# Patient Record
Sex: FEMALE | Race: OTHER | HISPANIC OR LATINO | Employment: OTHER | URBAN - METROPOLITAN AREA
[De-identification: names, ages, dates, MRNs, and addresses within clinical notes are randomized per-mention and may not be internally consistent; named-entity substitution may affect disease eponyms.]

---

## 2018-02-27 ENCOUNTER — HOSPITAL ENCOUNTER (EMERGENCY)
Facility: HOSPITAL | Age: 33
Discharge: HOME | End: 2018-02-27
Attending: EMERGENCY MEDICINE | Admitting: EMERGENCY MEDICINE
Payer: MEDICARE

## 2018-02-27 ENCOUNTER — APPOINTMENT (EMERGENCY)
Dept: RADIOLOGY | Facility: HOSPITAL | Age: 33
End: 2018-02-27
Payer: MEDICARE

## 2018-02-27 VITALS
OXYGEN SATURATION: 95 % | WEIGHT: 135 LBS | BODY MASS INDEX: 23.92 KG/M2 | HEART RATE: 74 BPM | HEIGHT: 63 IN | DIASTOLIC BLOOD PRESSURE: 57 MMHG | SYSTOLIC BLOOD PRESSURE: 124 MMHG | TEMPERATURE: 98.6 F | RESPIRATION RATE: 18 BRPM

## 2018-02-27 DIAGNOSIS — R07.9 CHEST PAIN, UNSPECIFIED TYPE: Primary | ICD-10-CM

## 2018-02-27 LAB
ALBUMIN SERPL BCP-MCNC: 4 G/DL (ref 3.5–5)
ALP SERPL-CCNC: 91 U/L (ref 46–116)
ALT SERPL W P-5'-P-CCNC: 16 U/L (ref 12–78)
AMPHETAMINES SERPL QL SCN: NEGATIVE
ANION GAP SERPL CALCULATED.3IONS-SCNC: 11 MMOL/L (ref 4–13)
APTT PPP: 27 SECONDS (ref 23–35)
AST SERPL W P-5'-P-CCNC: 11 U/L (ref 5–45)
BARBITURATES UR QL: NEGATIVE
BASOPHILS # BLD AUTO: 0.03 THOUSANDS/ΜL (ref 0–0.1)
BASOPHILS NFR BLD AUTO: 0 % (ref 0–1)
BENZODIAZ UR QL: NEGATIVE
BILIRUB SERPL-MCNC: 0.36 MG/DL (ref 0.2–1)
BUN SERPL-MCNC: 16 MG/DL (ref 5–25)
CALCIUM SERPL-MCNC: 8.6 MG/DL (ref 8.3–10.1)
CHLORIDE SERPL-SCNC: 106 MMOL/L (ref 100–108)
CK MB SERPL-MCNC: <1 % (ref 0–2.5)
CK MB SERPL-MCNC: <1 NG/ML (ref 0–5)
CK SERPL-CCNC: 146 U/L (ref 26–192)
CO2 SERPL-SCNC: 25 MMOL/L (ref 21–32)
COCAINE UR QL: NEGATIVE
CREAT SERPL-MCNC: 0.86 MG/DL (ref 0.6–1.3)
DEPRECATED D DIMER PPP: <270 NG/ML (FEU) (ref 0–424)
EOSINOPHIL # BLD AUTO: 0.08 THOUSAND/ΜL (ref 0–0.61)
EOSINOPHIL NFR BLD AUTO: 1 % (ref 0–6)
ERYTHROCYTE [DISTWIDTH] IN BLOOD BY AUTOMATED COUNT: 12.4 % (ref 11.6–15.1)
EXT PREG TEST URINE: NEGATIVE
GFR SERPL CREATININE-BSD FRML MDRD: 91 ML/MIN/1.73SQ M
GLUCOSE SERPL-MCNC: 102 MG/DL (ref 65–140)
HCT VFR BLD AUTO: 39 % (ref 34.8–46.1)
HGB BLD-MCNC: 13.2 G/DL (ref 11.5–15.4)
INR PPP: 1 (ref 0.86–1.16)
LYMPHOCYTES # BLD AUTO: 4.15 THOUSANDS/ΜL (ref 0.6–4.47)
LYMPHOCYTES NFR BLD AUTO: 37 % (ref 14–44)
MAGNESIUM SERPL-MCNC: 2 MG/DL (ref 1.6–2.6)
MCH RBC QN AUTO: 28.7 PG (ref 26.8–34.3)
MCHC RBC AUTO-ENTMCNC: 33.8 G/DL (ref 31.4–37.4)
MCV RBC AUTO: 85 FL (ref 82–98)
METHADONE UR QL: NEGATIVE
MONOCYTES # BLD AUTO: 0.66 THOUSAND/ΜL (ref 0.17–1.22)
MONOCYTES NFR BLD AUTO: 6 % (ref 4–12)
NEUTROPHILS # BLD AUTO: 6.2 THOUSANDS/ΜL (ref 1.85–7.62)
NEUTS SEG NFR BLD AUTO: 56 % (ref 43–75)
NRBC BLD AUTO-RTO: 0 /100 WBCS
OPIATES UR QL SCN: NEGATIVE
PCP UR QL: NEGATIVE
PLATELET # BLD AUTO: 207 THOUSANDS/UL (ref 149–390)
PMV BLD AUTO: 12.5 FL (ref 8.9–12.7)
POTASSIUM SERPL-SCNC: 3.4 MMOL/L (ref 3.5–5.3)
PROT SERPL-MCNC: 7.3 G/DL (ref 6.4–8.2)
PROTHROMBIN TIME: 13.2 SECONDS (ref 12.1–14.4)
RBC # BLD AUTO: 4.6 MILLION/UL (ref 3.81–5.12)
SODIUM SERPL-SCNC: 142 MMOL/L (ref 136–145)
THC UR QL: NEGATIVE
TROPONIN I SERPL-MCNC: <0.02 NG/ML
WBC # BLD AUTO: 11.12 THOUSAND/UL (ref 4.31–10.16)

## 2018-02-27 PROCEDURE — 85730 THROMBOPLASTIN TIME PARTIAL: CPT | Performed by: NURSE PRACTITIONER

## 2018-02-27 PROCEDURE — 84484 ASSAY OF TROPONIN QUANT: CPT | Performed by: EMERGENCY MEDICINE

## 2018-02-27 PROCEDURE — 82553 CREATINE MB FRACTION: CPT | Performed by: NURSE PRACTITIONER

## 2018-02-27 PROCEDURE — 83735 ASSAY OF MAGNESIUM: CPT | Performed by: NURSE PRACTITIONER

## 2018-02-27 PROCEDURE — 96374 THER/PROPH/DIAG INJ IV PUSH: CPT

## 2018-02-27 PROCEDURE — 80307 DRUG TEST PRSMV CHEM ANLYZR: CPT | Performed by: NURSE PRACTITIONER

## 2018-02-27 PROCEDURE — 36415 COLL VENOUS BLD VENIPUNCTURE: CPT | Performed by: NURSE PRACTITIONER

## 2018-02-27 PROCEDURE — 93005 ELECTROCARDIOGRAM TRACING: CPT

## 2018-02-27 PROCEDURE — 80053 COMPREHEN METABOLIC PANEL: CPT | Performed by: EMERGENCY MEDICINE

## 2018-02-27 PROCEDURE — 99285 EMERGENCY DEPT VISIT HI MDM: CPT

## 2018-02-27 PROCEDURE — 82550 ASSAY OF CK (CPK): CPT | Performed by: NURSE PRACTITIONER

## 2018-02-27 PROCEDURE — 85610 PROTHROMBIN TIME: CPT | Performed by: NURSE PRACTITIONER

## 2018-02-27 PROCEDURE — 71046 X-RAY EXAM CHEST 2 VIEWS: CPT

## 2018-02-27 PROCEDURE — 81025 URINE PREGNANCY TEST: CPT | Performed by: NURSE PRACTITIONER

## 2018-02-27 PROCEDURE — 85025 COMPLETE CBC W/AUTO DIFF WBC: CPT | Performed by: EMERGENCY MEDICINE

## 2018-02-27 PROCEDURE — 85379 FIBRIN DEGRADATION QUANT: CPT | Performed by: NURSE PRACTITIONER

## 2018-02-27 RX ORDER — KETOROLAC TROMETHAMINE 30 MG/ML
15 INJECTION, SOLUTION INTRAMUSCULAR; INTRAVENOUS ONCE
Status: COMPLETED | OUTPATIENT
Start: 2018-02-27 | End: 2018-02-27

## 2018-02-27 RX ORDER — MEDROXYPROGESTERONE ACETATE 150 MG/ML
150 INJECTION, SUSPENSION INTRAMUSCULAR
COMMUNITY

## 2018-02-27 RX ADMIN — KETOROLAC TROMETHAMINE 15 MG: 30 INJECTION, SOLUTION INTRAMUSCULAR at 20:03

## 2018-02-28 NOTE — DISCHARGE INSTRUCTIONS
You stated your pain was gone after pain medication administration  You refused to have IV fluids and requested discharge  You are to follow up with your PCP as instructed  Chest Pain, Ambulatory Care   GENERAL INFORMATION:   Chest pain  can be caused by a range of conditions, from not serious to life-threatening  It may be caused by a heart attack or a blood clot in your lungs  Sometimes chest pain or pressure is caused by poor blood flow to your heart (angina)  Infection, inflammation, or a fracture in the bones or cartilage in your chest can cause pain or discomfort  Chest pain can also be a symptom of a digestive problem, such as acid reflux or a stomach ulcer  Common symptoms include the following:   · Fever or sweating     · Nausea or vomiting     · Shortness of breath     · Discomfort or pressure that spreads from your chest to your back, jaw, or arm     · A racing or slow heartbeat     · Feeling weak, tired, or faint  Seek immediate care for the following symptoms:   · Any of the following signs of a heart attack:      ¨ Squeezing, pressure, or pain in your chest that lasts longer than 5 minutes or returns    ¨ Discomfort or pain in your back, neck, jaw, stomach, or arm     ¨ Trouble breathing     ¨ Nausea or vomiting    ¨ Lightheadedness or a sudden cold sweat, especially with trouble breathing         · Chest discomfort that gets worse, even with medicine    · Coughing or vomiting blood    · Black or bloody bowel movements     · Vomiting that does not stop, or pain when you swallow  Treatment for chest pain  may include medicine to treat your symptoms while he determines the cause of your chest pain  You may also need any of the following:  · Antiplatelets , such as aspirin, help prevent blood clots  Take your antiplatelet medicine exactly as directed  These medicines make it more likely for you to bleed or bruise  If you are told to take aspirin, do not take acetaminophen or ibuprofen instead  · Prescription pain medicine  may be given  Ask how to take this medicine safely  Do not smoke: If you smoke, it is never too late to quit  Smoking increases your risk for a heart attack and other heart and lung conditions  Ask your healthcare provider for information about how to stop smoking if you need help  Follow up with your healthcare provider as directed: You may need more tests  You may be referred to a specialist, such as a cardiologist or gastroenterologist  Write down your questions so you remember to ask them during your visits  CARE AGREEMENT:   You have the right to help plan your care  Learn about your health condition and how it may be treated  Discuss treatment options with your caregivers to decide what care you want to receive  You always have the right to refuse treatment  The above information is an  only  It is not intended as medical advice for individual conditions or treatments  Talk to your doctor, nurse or pharmacist before following any medical regimen to see if it is safe and effective for you  © 2014 [address] is for End User's use only and may not be sold, redistributed or otherwise used for commercial purposes  All illustrations and images included in CareNotes® are the copyrighted property of A D A M , Inc  or [address]

## 2018-02-28 NOTE — ED PROVIDER NOTES
History  Chief Complaint   Patient presents with    Chest Pain     pt was at home sitting in bed with kids watching youtube videos when sudden onset of left sided chest pain/epigastric pain started  reports feels hard to take a deep breath  This is a 29year old female who states while getting ready for bed around 630pm tonight she developed severe left chest which is under her left breast, pain that was worse with deep inspiration  She states that she had a sharp pain that felt like she was loosing her breath  She denies recent travel or hx of DVT or PE   + smoker, + depo provera  She denies taking anything for pain  Pt arrived via EMS         History provided by:  Patient and medical records   used: No        Prior to Admission Medications   Prescriptions Last Dose Informant Patient Reported? Taking? medroxyPROGESTERone (DEPO-PROVERA) 150 mg/mL injection   Yes Yes   Sig: Inject 150 mg into the shoulder, thigh, or buttocks every 3 (three) months      Facility-Administered Medications: None       Past Medical History:   Diagnosis Date    No known health problems        Past Surgical History:   Procedure Laterality Date    EYE SURGERY Bilateral        History reviewed  No pertinent family history  I have reviewed and agree with the history as documented  Social History   Substance Use Topics    Smoking status: Current Every Day Smoker     Packs/day: 0 20     Types: Cigarettes    Smokeless tobacco: Never Used    Alcohol use Yes        Review of Systems   Constitutional: Negative  HENT: Negative  Eyes: Negative  Respiratory: Positive for shortness of breath  Cardiovascular: Positive for chest pain  Gastrointestinal: Negative  Endocrine: Negative  Genitourinary: Negative  Musculoskeletal: Negative  Skin: Negative  Allergic/Immunologic: Negative  Neurological: Negative  Hematological: Negative  Psychiatric/Behavioral: Negative  Physical Exam  ED Triage Vitals   Temperature Pulse Respirations Blood Pressure SpO2   02/27/18 1845 02/27/18 1845 02/27/18 1845 02/27/18 1845 02/27/18 1845   98 6 °F (37 °C) 69 18 119/56 95 %      Temp Source Heart Rate Source Patient Position - Orthostatic VS BP Location FiO2 (%)   02/27/18 1845 02/27/18 1915 02/27/18 1915 02/27/18 1915 --   Oral Monitor Sitting Right arm       Pain Score       02/27/18 1845       8           Orthostatic Vital Signs  Vitals:    02/27/18 1845 02/27/18 1915   BP: 119/56 124/57   Pulse: 69 74   Patient Position - Orthostatic VS:  Sitting       Physical Exam   Constitutional: She is oriented to person, place, and time  She appears well-developed and well-nourished  No distress  Pt is lying on the exam bed looking at cell phone   HENT:   Head: Normocephalic and atraumatic  Eyes: EOM are normal  Pupils are equal, round, and reactive to light  Neck: Normal range of motion  Neck supple  Cardiovascular: Normal rate, regular rhythm and normal heart sounds  Pulmonary/Chest: Effort normal and breath sounds normal    Abdominal: Soft  Bowel sounds are normal  She exhibits no distension  There is no tenderness  Musculoskeletal: Normal range of motion  She exhibits no edema, tenderness or deformity  Neurological: She is alert and oriented to person, place, and time  Skin: Skin is warm and dry  Capillary refill takes less than 2 seconds  She is not diaphoretic  Psychiatric: She has a normal mood and affect  Her behavior is normal  Judgment and thought content normal    Nursing note and vitals reviewed        ED Medications  Medications   sodium chloride 0 9 % bolus 500 mL (not administered)   ketorolac (TORADOL) injection 15 mg (15 mg Intravenous Given 2/27/18 2003)       Diagnostic Studies  Results Reviewed     Procedure Component Value Units Date/Time    Magnesium [[id number]]  (Normal) Collected:  02/27/18 1849    Lab Status:  Final result Specimen:  Blood from Arm, Left Updated:  02/27/18 2049     Magnesium 2 0 mg/dL     CK Total with Reflex CKMB [[id number]]  (Normal) Collected:  02/27/18 1849    Lab Status:  Final result Specimen:  Blood from Arm, Left Updated:  02/27/18 2048     Total  U/L     CKMB [[id number]] Collected:  02/27/18 1849    Lab Status: In process Specimen:  Blood from Arm, Left Updated:  02/27/18 2048    D-Dimer [[id number]]  (Normal) Collected:  02/27/18 1937    Lab Status:  Final result Specimen:  Blood from Arm, Left Updated:  02/27/18 2039     D-Dimer, Quant <270 ng/ml (FEU)     Rapid drug screen, urine [[id number]]  (Normal) Collected:  02/27/18 1950    Lab Status:  Final result Specimen:  Urine from Urine, Clean Catch Updated:  02/27/18 2014     Amph/Meth UR Negative     Barbiturate Ur Negative     Benzodiazepine Urine Negative     Cocaine Urine Negative     Methadone Urine Negative     Opiate Urine Negative     PCP Ur Negative     THC Urine Negative    Narrative:         FOR MEDICAL PURPOSES ONLY  IF CONFIRMATION NEEDED PLEASE CONTACT THE LAB WITHIN 5 DAYS  Drug Screen Cutoff Levels:  AMPHETAMINE/METHAMPHETAMINES  1000 ng/mL  BARBITURATES     200 ng/mL  BENZODIAZEPINES     200 ng/mL  COCAINE      300 ng/mL  METHADONE      300 ng/mL  OPIATES      300 ng/mL  PHENCYCLIDINE     25 ng/mL  THC       50 ng/mL    Protime-INR [[id number]]  (Normal) Collected:  02/27/18 1937    Lab Status:  Final result Specimen:  Blood from Arm, Left Updated:  02/27/18 2009     Protime 13 2 seconds      INR 1 00    APTT [[id number]]  (Normal) Collected:  02/27/18 1937    Lab Status:  Final result Specimen:  Blood from Arm, Left Updated:  02/27/18 2009     PTT 27 seconds     Narrative:          Therapeutic Heparin Range = 60-90 seconds    POCT pregnancy, urine [[id number]]  (Normal) Resulted:  02/27/18 1953    Lab Status:  Final result Updated:  02/27/18 1953     EXT PREG TEST UR (Ref: Negative) negative    Comprehensive metabolic panel [[id number]]  (Abnormal) Collected:  02/27/18 1849 Lab Status:  Final result Specimen:  Blood from Arm, Left Updated:  02/27/18 1928     Sodium 142 mmol/L      Potassium 3 4 (L) mmol/L      Chloride 106 mmol/L      CO2 25 mmol/L      Anion Gap 11 mmol/L      BUN 16 mg/dL      Creatinine 0 86 mg/dL      Glucose 102 mg/dL      Calcium 8 6 mg/dL      AST 11 U/L      ALT 16 U/L      Alkaline Phosphatase 91 U/L      Total Protein 7 3 g/dL      Albumin 4 0 g/dL      Total Bilirubin 0 36 mg/dL      eGFR 91 ml/min/1 73sq m     Narrative:         National Kidney Disease Education Program recommendations are as follows:  GFR calculation is accurate only with a steady state creatinine  Chronic Kidney disease less than 60 ml/min/1 73 sq  meters  Kidney failure less than 15 ml/min/1 73 sq  meters  Troponin I [[id number]]  (Normal) Collected:  02/27/18 1849    Lab Status:  Final result Specimen:  Blood from Arm, Left Updated:  02/27/18 1924     Troponin I <0 02 ng/mL     Narrative:         [employer] Chemistry analyzer 99% cutoff is > 0 04 ng/mL in network labs    o cTnI 99% cutoff is useful only when applied to patients in the clinical setting of myocardial ischemia  o cTnI 99% cutoff should be interpreted in the context of clinical history, ECG findings and possibly cardiac imaging to establish correct diagnosis  o cTnI 99% cutoff may be suggestive but clearly not indicative of a coronary event without the clinical setting of myocardial ischemia      CBC and differential [[id number]]  (Abnormal) Collected:  02/27/18 1849    Lab Status:  Final result Specimen:  Blood from Arm, Left Updated:  02/27/18 1906     WBC 11 12 (H) Thousand/uL      RBC 4 60 Million/uL      Hemoglobin 13 2 g/dL      Hematocrit 39 0 %      MCV 85 fL      MCH 28 7 pg      MCHC 33 8 g/dL      RDW 12 4 %      MPV 12 5 fL      Platelets 742 Thousands/uL      nRBC 0 /100 WBCs      Neutrophils Relative 56 %      Lymphocytes Relative 37 %      Monocytes Relative 6 %      Eosinophils Relative 1 %      Basophils Relative 0 %      Neutrophils Absolute 6 20 Thousands/µL      Lymphocytes Absolute 4 15 Thousands/µL      Monocytes Absolute 0 66 Thousand/µL      Eosinophils Absolute 0 08 Thousand/µL      Basophils Absolute 0 03 Thousands/µL                  X-ray chest 2 views   Final Result by Celia Fatima MD (02/27 1949)      No acute cardiopulmonary disease  Workstation performed: [id number]                    Procedures  ECG 12 Lead Documentation  Date/Time: 2/27/2018 7:57 PM  Performed by: Amira Arriaga  Authorized by: Tricia JACOBS     Indications / Diagnosis:  Chest pain   ECG reviewed by me, the ED Provider: yes (Dr Crystal Sepulveda )    Patient location:  ED and bedside  Previous ECG:     Previous ECG:  Unavailable    Comparison to cardiac monitor: No    Interpretation:     Interpretation: abnormal    Rate:     ECG rate:  68    ECG rate assessment: normal    Rhythm:     Rhythm: sinus rhythm             Phone Contacts  ED Phone Contact    ED Course  ED Course as of Feb 27 2102 Tue Feb 27, 2018 2056 I entered room and pt was sleeping  Pt woke up and states "I am dizzy and my hands are numb"  I asked pt if the pain was gone  Pt states "for now"  I informed pt I would give her some fluids since she feels dizzy  She states "this is crazy, I am just sitting here"  I have informed pt that she is not just sitting there  I explained I have given her pain medication for the pain, labs to check for abnormalities and a CXR and that is not just sitting there  Pt states "this is crazy, I don't want fluids, I just want to go"  Pt will be d/c home with follow up and understanding of instructions                      PERC Rule for PE    Flowsheet Row Most Recent Value   PERC Rule for PE   Age >=50  0 Filed at: 02/27/2018 1941   HR >=100  0 Filed at: 02/27/2018 1941   O2 Sat on room air < 95%  1 [95%] Filed at: 02/27/2018 1941   History of PE or DVT  0 Filed at: 02/27/2018 1941   Recent trauma or surgery  0 Filed at: 02/27/2018 1941   Hemoptysis  0 Filed at: 02/27/2018 1941   Exogenous estrogen  1 Filed at: 02/27/2018 1941   Unilateral leg swelling  0 Filed at: 02/27/2018 1941   [address]  Rule for PE Results  2 Filed at: 02/27/2018 1941                      [facility]  Number of Diagnoses or Management Options  Diagnosis management comments: Differential diagnosis:  PE, costochondritis, MSK, MI, NSTEMI, STEMI    EKG  Tele  CXR - ordered for nurse protocol   Labs  UDS  UA hcg   Pain control        Amount and/or Complexity of Data Reviewed  Clinical lab tests: ordered and reviewed  Tests in the radiology section of CPT®: ordered and reviewed  Review and summarize past medical records: yes      CritCare Time    Disposition  Final diagnoses:   Chest pain, unspecified type     Time reflects when diagnosis was documented in both MDM as applicable and the Disposition within this note     Time User Action Codes Description Comment    2/27/2018  8:58 PM Jodi Sadler Add [R07 9] Chest pain, unspecified type       ED Disposition     ED Disposition Condition Comment    Discharge  Modesto Meyer discharge to home/self care  Condition at discharge: Good        Follow-up Information     Follow up With Specialties Details Why Contact Info Additional Asiya [place] [facility] Schedule an appointment as soon as possible for a visit in 2 days If symptoms worsen [facility] [address] [address] [phone] [address]   if you need a [phone]       [facility] Emergency Medicine  If symptoms worsen [address]  [phone] [place] ED, [address] [facility] [address]  , [place], [place], [address]        Patient's Medications   Discharge Prescriptions    No medications on file     No discharge procedures on file      ED Provider  Electronically Signed by           Brielle Sierra  02/27/18 8510

## 2018-03-03 LAB
ATRIAL RATE: 68 BPM
P AXIS: 50 DEGREES
PR INTERVAL: 122 MS
QRS AXIS: 77 DEGREES
QRSD INTERVAL: 84 MS
QT INTERVAL: 380 MS
QTC INTERVAL: 404 MS
T WAVE AXIS: 64 DEGREES
VENTRICULAR RATE: 68 BPM

## 2018-03-03 PROCEDURE — 93010 ELECTROCARDIOGRAM REPORT: CPT | Performed by: INTERNAL MEDICINE

## 2019-09-02 ENCOUNTER — HOSPITAL ENCOUNTER (EMERGENCY)
Facility: HOSPITAL | Age: 34
Discharge: HOME | End: 2019-09-02
Attending: EMERGENCY MEDICINE | Admitting: EMERGENCY MEDICINE
Payer: MEDICARE

## 2019-09-02 VITALS
BODY MASS INDEX: 27.18 KG/M2 | OXYGEN SATURATION: 96 % | SYSTOLIC BLOOD PRESSURE: 140 MMHG | TEMPERATURE: 97.3 F | HEART RATE: 85 BPM | RESPIRATION RATE: 18 BRPM | WEIGHT: 153.44 LBS | DIASTOLIC BLOOD PRESSURE: 66 MMHG

## 2019-09-02 DIAGNOSIS — L50.9 HIVES: Primary | ICD-10-CM

## 2019-09-02 PROCEDURE — 99282 EMERGENCY DEPT VISIT SF MDM: CPT | Performed by: PHYSICIAN ASSISTANT

## 2019-09-02 PROCEDURE — 99282 EMERGENCY DEPT VISIT SF MDM: CPT

## 2019-09-02 RX ORDER — LORATADINE 10 MG/1
10 TABLET ORAL DAILY
Qty: 10 TABLET | Refills: 0 | Status: SHIPPED | OUTPATIENT
Start: 2019-09-02 | End: 2019-09-12

## 2019-09-03 NOTE — DISCHARGE INSTRUCTIONS
Urticaria   WHAT YOU NEED TO KNOW:   Urticaria is also called hives  Hives can change size and shape, and appear anywhere on your skin  They can be mild or severe and last from a few minutes to a few days  Hives may be a sign of a severe allergic reaction called anaphylaxis that needs immediate treatment  Urticaria that lasts longer than 6 weeks may be a chronic condition that needs long-term treatment  DISCHARGE INSTRUCTIONS:   Call 911 for signs or symptoms of anaphylaxis,  such as trouble breathing, swelling in your mouth or throat, or wheezing  You may also have itching, a rash, or feel like you are going to faint  Return to the emergency department if:   · Your heart is beating faster than it normally does  · You have cramping or severe pain in your abdomen  Contact your healthcare provider if:   · You have a fever  · Your skin still itches 24 hours after you take your medicine  · You still have hives after 7 days  · Your joints are painful and swollen  · You have questions or concerns about your condition or care  Medicines:   · Epinephrine  is used to treat severe allergic reactions such as anaphylaxis  · Antihistamines  decrease mild symptoms such as itching or a rash  · Steroids  decrease redness, pain, and swelling  · Take your medicine as directed  Contact your healthcare provider if you think your medicine is not helping or if you have side effects  Tell him of her if you are allergic to any medicine  Keep a list of the medicines, vitamins, and herbs you take  Include the amounts, and when and why you take them  Bring the list or the pill bottles to follow-up visits  Carry your medicine list with you in case of an emergency  Steps to take for signs or symptoms of anaphylaxis:   · Immediately  give 1 shot of epinephrine only into the outer thigh muscle  · Leave the shot in place  as directed   Your healthcare provider may recommend you leave it in place for up to 10 seconds before you remove it  This helps make sure all of the epinephrine is delivered  · Call 911 and go to the emergency department,  even if the shot improved symptoms  Do not drive yourself  Bring the used epinephrine shot with you  Safety precautions to take if you are at risk for anaphylaxis:   · Keep 2 shots of epinephrine with you at all times  You may need a second shot, because epinephrine only works for about 20 minutes and symptoms may return  Your healthcare provider can show you and family members how to give the shot  Check the expiration date every month and replace it before it expires  · Create an action plan  Your healthcare provider can help you create a written plan that explains the allergy and an emergency plan to treat a reaction  The plan explains when to give a second epinephrine shot if symptoms return or do not improve after the first  Give copies of the action plan and emergency instructions to family members, work and school staff, and  providers  Show them how to give a shot of epinephrine  · Be careful when you exercise  If you have had exercise-induced anaphylaxis, do not exercise right after you eat  Stop exercising right away if you start to develop any signs or symptoms of anaphylaxis  You may first feel tired, warm, or have itchy skin  Hives, swelling, and severe breathing problems may develop if you continue to exercise  · Carry medical alert identification  Wear medical alert jewelry or carry a card that explains the allergy  Ask your healthcare provider where to get these items  · Keep a record of triggers and symptoms  Record everything you eat, drink, or apply to your skin for 3 weeks  Include stressful events and what you were doing right before your hives started  Bring the record with you to follow-up visits with your healthcare provider  Manage urticaria:   · Cool your skin  This may help decrease itching   Apply a cool pack to your hives  Dip a hand towel in cool water, wring it out, and place it on your hives  You may also soak your skin in a cool oatmeal bath  · Do not rub your hives  This can irritate your skin and cause more hives  · Wear loose clothing  Tight clothes may irritate your skin and cause more hives  · Manage stress  Stress may trigger hives, or make them worse  Learn new ways to relax, such as deep breathing  Follow up with your healthcare provider as directed:  Write down your questions so you remember to ask them during your visits  © 2017 [address] Information is for End User's use only and may not be sold, redistributed or otherwise used for commercial purposes  All illustrations and images included in CareNotes® are the copyrighted property of A D A M , Inc  or Kory Olguin  The above information is an  only  It is not intended as medical advice for individual conditions or treatments  Talk to your doctor, nurse or pharmacist before following any medical regimen to see if it is safe and effective for you

## 2019-09-03 NOTE — ED PROVIDER NOTES
History  Chief Complaint   Patient presents with    Rash     rash that started yesterday, reports rash to be itchy  37yo female presents emergency room for evaluation of itchy rash on her abdomen and sides  States it is spreading to her back  Onset today  No self-treatment  Denies new allergic contacts  Admits to recently moving and is concerned about bed bugs or scabies  Admits to being stressed due to the recent move  Denies fever  Admits to mild recent sore throat/nasal congestion which has been resolving after getting sick from her kids  States she otherwise feels well today  History provided by:  Patient  Rash   Associated symptoms: no fever and no shortness of breath        Prior to Admission Medications   Prescriptions Last Dose Informant Patient Reported? Taking? medroxyPROGESTERone (DEPO-PROVERA) 150 mg/mL injection   Yes Yes   Sig: Inject 150 mg into the shoulder, thigh, or buttocks every 3 (three) months      Facility-Administered Medications: None       Past Medical History:   Diagnosis Date    No known health problems        Past Surgical History:   Procedure Laterality Date    EYE SURGERY Bilateral        History reviewed  No pertinent family history  I have reviewed and agree with the history as documented  Social History     Tobacco Use    Smoking status: Current Every Day Smoker     Packs/day: 0 20     Types: Cigarettes    Smokeless tobacco: Never Used   Substance Use Topics    Alcohol use: Yes    Drug use: No        Review of Systems   Constitutional: Negative for chills and fever  HENT: Negative for facial swelling and mouth sores  Eyes: Negative for discharge and itching  Respiratory: Negative for shortness of breath  Skin: Positive for rash  Physical Exam  Physical Exam   Constitutional: She appears well-developed and well-nourished  HENT:   Mouth/Throat: Oropharynx is clear and moist    Eyes: Conjunctivae are normal    Neck: Neck supple  Cardiovascular: Normal rate, regular rhythm and normal heart sounds  Pulmonary/Chest: Effort normal and breath sounds normal    Lymphadenopathy:     She has no cervical adenopathy  Skin: Skin is warm and dry  Capillary refill takes less than 2 seconds  Rash (tiny spots throughout abdomen, sides, and back) noted  No ecchymosis, no petechiae and no purpura noted  Rash is macular  Rash is not papular and not vesicular  There is erythema  Palms and soles clear, areas between fingers and toes clear  Psychiatric: She has a normal mood and affect  Nursing note and vitals reviewed  Vital Signs  ED Triage Vitals   Temperature Pulse Respirations Blood Pressure SpO2   09/02/19 2015 09/02/19 2016 09/02/19 2016 09/02/19 2016 09/02/19 2016   (!) 97 3 °F (36 3 °C) 85 18 140/66 96 %      Temp Source Heart Rate Source Patient Position - Orthostatic VS BP Location FiO2 (%)   09/02/19 2015 09/02/19 2016 09/02/19 2016 09/02/19 2016 --   Temporal Monitor Sitting Right arm       Pain Score       --                  Vitals:    09/02/19 2016   BP: 140/66   Pulse: 85   Patient Position - Orthostatic VS: Sitting         Visual Acuity      ED Medications  Medications - No data to display    Diagnostic Studies  Results Reviewed     None                 No orders to display              Procedures  Procedures       ED Course                               MDM    Disposition  Final diagnoses:   Hives     Time reflects when diagnosis was documented in both MDM as applicable and the Disposition within this note     Time User Action Codes Description Comment    9/2/2019  8:27 PM Misael Avila 420       ED Disposition     ED Disposition Condition Date/Time Comment    Discharge Stable Mon Sep 2, 2019  8:27 PM Hal Bardales discharge to home/self care              Follow-up Information     Follow up With Specialties Details Why Contact Info Additional Information    Infolink  In 3 days  [phone]       [facility] HOLLIE THOMPSON  [facility] Emergency Department Emergency Medicine  If symptoms worsen [place] [id number]  [address] , [place], [place], [address]          Discharge Medication List as of 9/2/2019  8:28 PM      START taking these medications    Details   loratadine (CLARITIN) 10 mg tablet Take 1 tablet (10 mg total) by mouth daily for 10 days, Starting Mon 9/2/2019, Until Thu 9/12/2019, Print         CONTINUE these medications which have NOT CHANGED    Details   medroxyPROGESTERone (DEPO-PROVERA) 150 mg/mL injection Inject 150 mg into the shoulder, thigh, or buttocks every 3 (three) months, Historical Med           No discharge procedures on file      ED Provider  Electronically Signed by           Dorys Diggs PA-C  09/02/19 6292

## 2019-12-21 ENCOUNTER — APPOINTMENT (OUTPATIENT)
Dept: RADIOLOGY | Facility: HOSPITAL | Age: 34
End: 2019-12-21
Payer: MEDICARE

## 2019-12-21 ENCOUNTER — APPOINTMENT (EMERGENCY)
Dept: CT IMAGING | Facility: HOSPITAL | Age: 34
End: 2019-12-21
Payer: MEDICARE

## 2019-12-21 ENCOUNTER — HOSPITAL ENCOUNTER (OUTPATIENT)
Facility: HOSPITAL | Age: 34
Setting detail: OBSERVATION
Discharge: HOME | End: 2019-12-22
Attending: SURGERY | Admitting: SURGERY
Payer: MEDICARE

## 2019-12-21 ENCOUNTER — HOSPITAL ENCOUNTER (EMERGENCY)
Facility: HOSPITAL | Age: 34
End: 2019-12-21
Attending: EMERGENCY MEDICINE
Payer: MEDICARE

## 2019-12-21 VITALS
WEIGHT: 157.85 LBS | TEMPERATURE: 99 F | SYSTOLIC BLOOD PRESSURE: 100 MMHG | HEART RATE: 94 BPM | RESPIRATION RATE: 18 BRPM | BODY MASS INDEX: 27.96 KG/M2 | OXYGEN SATURATION: 100 % | DIASTOLIC BLOOD PRESSURE: 58 MMHG

## 2019-12-21 DIAGNOSIS — J93.9 PNEUMOTHORAX: ICD-10-CM

## 2019-12-21 DIAGNOSIS — S22.31XA CLOSED FRACTURE OF ONE RIB OF RIGHT SIDE, INITIAL ENCOUNTER: ICD-10-CM

## 2019-12-21 DIAGNOSIS — S22.31XA CLOSED FRACTURE OF ONE RIB OF RIGHT SIDE, INITIAL ENCOUNTER: Primary | ICD-10-CM

## 2019-12-21 DIAGNOSIS — F10.929 ALCOHOL INTOXICATION (HCC): ICD-10-CM
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
## 2019-12-21 PROBLEM — S27.329A PULMONARY CONTUSION: Status: ACTIVE | Noted: 2019-12-21

## 2019-12-21 LAB
BASOPHILS # BLD AUTO: 0 THOUSANDS/ΜL (ref 0–0.1)
BASOPHILS NFR BLD AUTO: 0 % (ref 0–1)
EOSINOPHIL # BLD AUTO: 0.1 THOUSAND/ΜL (ref 0–0.4)
EOSINOPHIL NFR BLD AUTO: 1 % (ref 0–6)
ERYTHROCYTE [DISTWIDTH] IN BLOOD BY AUTOMATED COUNT: 12.9 %
ETHANOL SERPL-MCNC: 244 MG/DL (ref 0–10)
EXT PREG TEST URINE: NEGATIVE
EXT. CONTROL ED NAV: NORMAL
HCT VFR BLD AUTO: 47.1 % (ref 36–46)
HGB BLD-MCNC: 15.6 G/DL (ref 12–16)
LYMPHOCYTES # BLD AUTO: 3.2 THOUSANDS/ΜL (ref 0.5–4)
LYMPHOCYTES NFR BLD AUTO: 31 % (ref 25–45)
MCH RBC QN AUTO: 27.8 PG (ref 26–34)
MCHC RBC AUTO-ENTMCNC: 33.2 G/DL (ref 31–36)
MCV RBC AUTO: 84 FL (ref 80–100)
MONOCYTES # BLD AUTO: 0.5 THOUSAND/ΜL (ref 0.2–0.9)
MONOCYTES NFR BLD AUTO: 5 % (ref 1–10)
NEUTROPHILS # BLD AUTO: 6.6 THOUSANDS/ΜL (ref 1.8–7.8)
NEUTS SEG NFR BLD AUTO: 64 % (ref 45–65)
PLATELET # BLD AUTO: 231 THOUSANDS/UL (ref 150–450)
PMV BLD AUTO: 10.9 FL (ref 8.9–12.7)
RBC # BLD AUTO: 5.62 MILLION/UL (ref 4–5.2)
WBC # BLD AUTO: 10.3 THOUSAND/UL (ref 4.5–11)

## 2019-12-21 PROCEDURE — 36415 COLL VENOUS BLD VENIPUNCTURE: CPT | Performed by: EMERGENCY MEDICINE

## 2019-12-21 PROCEDURE — 96374 THER/PROPH/DIAG INJ IV PUSH: CPT

## 2019-12-21 PROCEDURE — 99284 EMERGENCY DEPT VISIT MOD MDM: CPT | Performed by: EMERGENCY MEDICINE

## 2019-12-21 PROCEDURE — 97166 OT EVAL MOD COMPLEX 45 MIN: CPT

## 2019-12-21 PROCEDURE — G8978 MOBILITY CURRENT STATUS: HCPCS

## 2019-12-21 PROCEDURE — 70450 CT HEAD/BRAIN W/O DYE: CPT

## 2019-12-21 PROCEDURE — 72125 CT NECK SPINE W/O DYE: CPT

## 2019-12-21 PROCEDURE — 99220 PR INITIAL OBSERVATION CARE/DAY 70 MINUTES: CPT | Performed by: SURGERY

## 2019-12-21 PROCEDURE — 99285 EMERGENCY DEPT VISIT HI MDM: CPT

## 2019-12-21 PROCEDURE — 80320 DRUG SCREEN QUANTALCOHOLS: CPT | Performed by: EMERGENCY MEDICINE

## 2019-12-21 PROCEDURE — 97163 PT EVAL HIGH COMPLEX 45 MIN: CPT

## 2019-12-21 PROCEDURE — G8987 SELF CARE CURRENT STATUS: HCPCS

## 2019-12-21 PROCEDURE — 74177 CT ABD & PELVIS W/CONTRAST: CPT

## 2019-12-21 PROCEDURE — 85025 COMPLETE CBC W/AUTO DIFF WBC: CPT | Performed by: EMERGENCY MEDICINE

## 2019-12-21 PROCEDURE — 81025 URINE PREGNANCY TEST: CPT | Performed by: EMERGENCY MEDICINE

## 2019-12-21 PROCEDURE — 71260 CT THORAX DX C+: CPT

## 2019-12-21 PROCEDURE — 94760 N-INVAS EAR/PLS OXIMETRY 1: CPT

## 2019-12-21 PROCEDURE — G8988 SELF CARE GOAL STATUS: HCPCS

## 2019-12-21 PROCEDURE — G8989 SELF CARE D/C STATUS: HCPCS

## 2019-12-21 PROCEDURE — 71046 X-RAY EXAM CHEST 2 VIEWS: CPT

## 2019-12-21 PROCEDURE — G8979 MOBILITY GOAL STATUS: HCPCS

## 2019-12-21 RX ORDER — ONDANSETRON 2 MG/ML
4 INJECTION INTRAMUSCULAR; INTRAVENOUS EVERY 4 HOURS PRN
Status: DISCONTINUED | OUTPATIENT
Start: 2019-12-21 | End: 2019-12-22 | Stop reason: HOSPADM

## 2019-12-21 RX ORDER — METHOCARBAMOL 750 MG/1
750 TABLET, FILM COATED ORAL EVERY 6 HOURS SCHEDULED
Status: DISCONTINUED | OUTPATIENT
Start: 2019-12-21 | End: 2019-12-22 | Stop reason: HOSPADM

## 2019-12-21 RX ORDER — NICOTINE 21 MG/24HR
21 PATCH, TRANSDERMAL 24 HOURS TRANSDERMAL DAILY
Status: DISCONTINUED | OUTPATIENT
Start: 2019-12-21 | End: 2019-12-22 | Stop reason: HOSPADM

## 2019-12-21 RX ORDER — KETOROLAC TROMETHAMINE 30 MG/ML
15 INJECTION, SOLUTION INTRAMUSCULAR; INTRAVENOUS ONCE
Status: COMPLETED | OUTPATIENT
Start: 2019-12-21 | End: 2019-12-21

## 2019-12-21 RX ORDER — KETOROLAC TROMETHAMINE 30 MG/ML
30 INJECTION, SOLUTION INTRAMUSCULAR; INTRAVENOUS ONCE
Status: COMPLETED | OUTPATIENT
Start: 2019-12-21 | End: 2019-12-21

## 2019-12-21 RX ORDER — OXYCODONE HYDROCHLORIDE 5 MG/1
5 TABLET ORAL EVERY 4 HOURS PRN
Status: DISCONTINUED | OUTPATIENT
Start: 2019-12-21 | End: 2019-12-22 | Stop reason: HOSPADM

## 2019-12-21 RX ORDER — ACETAMINOPHEN 325 MG/1
650 TABLET ORAL EVERY 4 HOURS PRN
Status: DISCONTINUED | OUTPATIENT
Start: 2019-12-21 | End: 2019-12-21

## 2019-12-21 RX ORDER — NICOTINE 21 MG/24HR
1 PATCH, TRANSDERMAL 24 HOURS TRANSDERMAL DAILY
Status: DISCONTINUED | OUTPATIENT
Start: 2019-12-21 | End: 2019-12-21

## 2019-12-21 RX ORDER — ACETAMINOPHEN 325 MG/1
975 TABLET ORAL EVERY 8 HOURS SCHEDULED
Status: DISCONTINUED | OUTPATIENT
Start: 2019-12-21 | End: 2019-12-22 | Stop reason: HOSPADM

## 2019-12-21 RX ORDER — HYDROMORPHONE HCL/PF 1 MG/ML
0.5 SYRINGE (ML) INJECTION
Status: DISCONTINUED | OUTPATIENT
Start: 2019-12-21 | End: 2019-12-21

## 2019-12-21 RX ORDER — ONDANSETRON 2 MG/ML
INJECTION INTRAMUSCULAR; INTRAVENOUS
Status: COMPLETED
Start: 2019-12-21 | End: 2019-12-21

## 2019-12-21 RX ORDER — OXYCODONE HYDROCHLORIDE 10 MG/1
10 TABLET ORAL EVERY 4 HOURS PRN
Status: DISCONTINUED | OUTPATIENT
Start: 2019-12-21 | End: 2019-12-22 | Stop reason: HOSPADM

## 2019-12-21 RX ADMIN — ENOXAPARIN SODIUM 40 MG: 40 INJECTION SUBCUTANEOUS at 17:32

## 2019-12-21 RX ADMIN — NICOTINE 1 PATCH: 21 PATCH, EXTENDED RELEASE TRANSDERMAL at 08:56

## 2019-12-21 RX ADMIN — ACETAMINOPHEN 975 MG: 325 TABLET ORAL at 21:51

## 2019-12-21 RX ADMIN — METHOCARBAMOL 750 MG: 750 TABLET, FILM COATED ORAL at 17:32

## 2019-12-21 RX ADMIN — OXYCODONE HYDROCHLORIDE 10 MG: 10 TABLET ORAL at 14:41

## 2019-12-21 RX ADMIN — ONDANSETRON 4 MG: 2 INJECTION INTRAMUSCULAR; INTRAVENOUS at 14:41

## 2019-12-21 RX ADMIN — ACETAMINOPHEN 650 MG: 325 TABLET ORAL at 08:56

## 2019-12-21 RX ADMIN — METHOCARBAMOL 750 MG: 750 TABLET, FILM COATED ORAL at 10:46

## 2019-12-21 RX ADMIN — OXYCODONE HYDROCHLORIDE 10 MG: 10 TABLET ORAL at 22:40

## 2019-12-21 RX ADMIN — KETOROLAC TROMETHAMINE 30 MG: 30 INJECTION, SOLUTION INTRAMUSCULAR; INTRAVENOUS at 02:57

## 2019-12-21 RX ADMIN — ENOXAPARIN SODIUM 30 MG: 30 INJECTION SUBCUTANEOUS at 08:56

## 2019-12-21 RX ADMIN — KETOROLAC TROMETHAMINE 15 MG: 30 INJECTION, SOLUTION INTRAMUSCULAR at 10:46

## 2019-12-21 RX ADMIN — OXYCODONE HYDROCHLORIDE 5 MG: 5 TABLET ORAL at 08:55

## 2019-12-21 RX ADMIN — ACETAMINOPHEN 975 MG: 325 TABLET ORAL at 14:41

## 2019-12-21 RX ADMIN — IOHEXOL 100 ML: 350 INJECTION, SOLUTION INTRAVENOUS at 03:47
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
## 2019-12-21 NOTE — ED NOTES
Patient returned from cat scan - is more relaxed since receiving the toradol - sleeping when not disturbed       Kory Avery RN  12/21/19 7992
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
## 2019-12-21 NOTE — ED NOTES
Upon search of belongings multiple loose broken pills found in purse and wallet  They were discarded by this tech and MENDEL Abbott  12/21/19 5101

## 2019-12-21 NOTE — ED NOTES
Pt refuses to keep cervical collar on  Pt states it causes extreme anxiety  Pt is unable to sit still or follow direction  Pt yelling she wants her cell phone and "please someone help me"  Difficulty keeping pt in bed  Unsteady on feet  While transporting pt to cat scan, pt continued to thrash back and forth on litter        Raphael Bazzi  12/21/19 7821

## 2019-12-21 NOTE — ED NOTES
Patient noted to be sleeping with adequate saturations and respirations       Bethany Brasher RN  12/21/19 2118

## 2019-12-21 NOTE — LETTER
[address] [facility] [address]  [address] [address]  Dept: [phone]    December 22, 2019     Patient: Miri Deleon   YOB: 1985   Date of Visit: 12/21/2019       To Whom it May Concern:    Miri Deleon is under my professional care  She was seen in the hospital from 12/21/2019   to 12/22/19  She is unable to return to work at this time  She will be seen for follow up in 2 weeks and re-evaluated at this time  If you have any questions or concerns, please don't hesitate to call           Sincerely,          Hank Cheek
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
## 2019-12-21 NOTE — ED NOTES
Pt ripped off all cardiac wires, blood pressure cuff and was found laying in downward dog position on [address] [place] [address], RN  12/21/19 6281
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
## 2019-12-21 NOTE — ED NOTES
Patient was assisted oob to the bathroom - gait was very unsteady  Returned to bed, patient c/oright sided abdominal pain and rib cage pain  Patient is anxious  Patient was medicated with toradol for her c/o pain prior to going to cat scan       Emily Mayes RN  12/21/19 1285
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
## 2019-12-21 NOTE — RESPIRATORY THERAPY NOTE
RT Protocol Note  Jacoby Pelaez 30 y o  female MRN: [id number]  Unit/Bed#: ED 17 Encounter: [id number]    Assessment    Active Problems:    Closed fracture of one rib of right side    Pneumothorax    Pulmonary contusion      Home Pulmonary Medications:  none       Past Medical History:   Diagnosis Date    Epilepsia ([address] )     No known health problems      Social History     Socioeconomic History    Marital status: Single     Spouse name: None    Number of children: None    Years of education: None    Highest education level: None   Occupational History    None   Social Needs    Financial resource strain: None    Food insecurity:     Worry: None     Inability: None    Transportation needs:     Medical: None     Non-medical: None   Tobacco Use    Smoking status: Current Every Day Smoker     Packs/day: 0 20     Types: Cigarettes    Smokeless tobacco: Never Used   Substance and Sexual Activity    Alcohol use:  Yes    Drug use: No    Sexual activity: None   Lifestyle    Physical activity:     Days per week: None     Minutes per session: None    Stress: None   Relationships    Social connections:     Talks on phone: None     Gets together: None     Attends Mandaeism service: None     Active member of club or organization: None     Attends meetings of clubs or organizations: None     Relationship status: None    Intimate partner violence:     Fear of current or ex partner: None     Emotionally abused: None     Physically abused: None     Forced sexual activity: None   Other Topics Concern    None   Social History Narrative    Flu shot:yes       Subjective         Objective    Physical Exam:   Assessment Type: Assess only  General Appearance: Alert, Awake  Respiratory Pattern: Normal  Chest Assessment: Chest expansion symmetrical  Bilateral Breath Sounds: Clear, Diminished  Cough: Productive  O2 Device: rma    Vitals:  Blood pressure 110/69, pulse 104, temperature 98 3 °F (36 8 °C), temperature source Oral, resp  rate 20, SpO2 99 %  Imaging and other studies: I have personally reviewed pertinent reports  O2 Device: rma     Plan       Airway Clearance Plan: Incentive Spirometer     Resp Comments: pt evaluated per protocol  pt is lying in bed not showing any resp discomfort but yet c/o having a hard time taking a deep breath  Pt was instructed properly on IS  pt on rma and bs= clear throughout  no other therapy is indicated a this time
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
## 2019-12-22 ENCOUNTER — APPOINTMENT (OUTPATIENT)
Dept: RADIOLOGY | Facility: HOSPITAL | Age: 34
End: 2019-12-22
Payer: MEDICARE

## 2019-12-22 VITALS
TEMPERATURE: 98.4 F | DIASTOLIC BLOOD PRESSURE: 68 MMHG | HEART RATE: 66 BPM | HEIGHT: 62 IN | SYSTOLIC BLOOD PRESSURE: 101 MMHG | BODY MASS INDEX: 29.05 KG/M2 | RESPIRATION RATE: 16 BRPM | WEIGHT: 157.85 LBS | OXYGEN SATURATION: 97 %
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
## 2019-12-22 LAB
ANION GAP SERPL CALCULATED.3IONS-SCNC: 3 MMOL/L (ref 4–13)
BASOPHILS # BLD AUTO: 0.03 THOUSANDS/ΜL (ref 0–0.1)
BASOPHILS NFR BLD AUTO: 0 % (ref 0–1)
BUN SERPL-MCNC: 16 MG/DL (ref 5–25)
CALCIUM SERPL-MCNC: 8.6 MG/DL (ref 8.3–10.1)
CHLORIDE SERPL-SCNC: 110 MMOL/L (ref 100–108)
CO2 SERPL-SCNC: 28 MMOL/L (ref 21–32)
CREAT SERPL-MCNC: 0.85 MG/DL (ref 0.6–1.3)
EOSINOPHIL # BLD AUTO: 0.08 THOUSAND/ΜL (ref 0–0.61)
EOSINOPHIL NFR BLD AUTO: 1 % (ref 0–6)
ERYTHROCYTE [DISTWIDTH] IN BLOOD BY AUTOMATED COUNT: 12.5 % (ref 11.6–15.1)
GFR SERPL CREATININE-BSD FRML MDRD: 91 ML/MIN/1.73SQ M
GLUCOSE SERPL-MCNC: 115 MG/DL (ref 65–140)
HCT VFR BLD AUTO: 41.5 % (ref 34.8–46.1)
HGB BLD-MCNC: 13 G/DL (ref 11.5–15.4)
IMM GRANULOCYTES # BLD AUTO: 0.02 THOUSAND/UL (ref 0–0.2)
IMM GRANULOCYTES NFR BLD AUTO: 0 % (ref 0–2)
LYMPHOCYTES # BLD AUTO: 3.61 THOUSANDS/ΜL (ref 0.6–4.47)
LYMPHOCYTES NFR BLD AUTO: 39 % (ref 14–44)
MCH RBC QN AUTO: 27.2 PG (ref 26.8–34.3)
MCHC RBC AUTO-ENTMCNC: 31.3 G/DL (ref 31.4–37.4)
MCV RBC AUTO: 87 FL (ref 82–98)
MONOCYTES # BLD AUTO: 0.6 THOUSAND/ΜL (ref 0.17–1.22)
MONOCYTES NFR BLD AUTO: 6 % (ref 4–12)
NEUTROPHILS # BLD AUTO: 5 THOUSANDS/ΜL (ref 1.85–7.62)
NEUTS SEG NFR BLD AUTO: 54 % (ref 43–75)
NRBC BLD AUTO-RTO: 0 /100 WBCS
PLATELET # BLD AUTO: 192 THOUSANDS/UL (ref 149–390)
PMV BLD AUTO: 12.3 FL (ref 8.9–12.7)
POTASSIUM SERPL-SCNC: 3.5 MMOL/L (ref 3.5–5.3)
RBC # BLD AUTO: 4.78 MILLION/UL (ref 3.81–5.12)
SODIUM SERPL-SCNC: 141 MMOL/L (ref 136–145)
WBC # BLD AUTO: 9.34 THOUSAND/UL (ref 4.31–10.16)

## 2019-12-22 PROCEDURE — 80048 BASIC METABOLIC PNL TOTAL CA: CPT | Performed by: EMERGENCY MEDICINE

## 2019-12-22 PROCEDURE — 85025 COMPLETE CBC W/AUTO DIFF WBC: CPT | Performed by: EMERGENCY MEDICINE

## 2019-12-22 PROCEDURE — 99217 PR OBSERVATION CARE DISCHARGE MANAGEMENT: CPT | Performed by: PHYSICIAN ASSISTANT

## 2019-12-22 PROCEDURE — 71046 X-RAY EXAM CHEST 2 VIEWS: CPT

## 2019-12-22 RX ORDER — ACETAMINOPHEN 325 MG/1
975 TABLET ORAL EVERY 8 HOURS SCHEDULED
Qty: 30 TABLET | Refills: 0
Start: 2019-12-22

## 2019-12-22 RX ORDER — METHOCARBAMOL 750 MG/1
750 TABLET, FILM COATED ORAL EVERY 6 HOURS SCHEDULED
Qty: 60 TABLET | Refills: 0 | Status: SHIPPED | OUTPATIENT
Start: 2019-12-22 | End: 2019-12-28

## 2019-12-22 RX ORDER — OXYCODONE HYDROCHLORIDE 5 MG/1
5 TABLET ORAL EVERY 4 HOURS PRN
Qty: 30 TABLET | Refills: 0 | Status: SHIPPED | OUTPATIENT
Start: 2019-12-22 | End: 2020-01-01

## 2019-12-22 RX ADMIN — OXYCODONE HYDROCHLORIDE 10 MG: 10 TABLET ORAL at 12:20

## 2019-12-22 RX ADMIN — ACETAMINOPHEN 975 MG: 325 TABLET ORAL at 05:27

## 2019-12-22 RX ADMIN — OXYCODONE HYDROCHLORIDE 10 MG: 10 TABLET ORAL at 09:10

## 2019-12-22 RX ADMIN — METHOCARBAMOL 750 MG: 750 TABLET, FILM COATED ORAL at 11:09

## 2019-12-22 RX ADMIN — OXYCODONE HYDROCHLORIDE 10 MG: 10 TABLET ORAL at 05:27

## 2019-12-22 RX ADMIN — METHOCARBAMOL 750 MG: 750 TABLET, FILM COATED ORAL at 00:57

## 2019-12-22 RX ADMIN — NICOTINE 21 MG: 21 PATCH, EXTENDED RELEASE TRANSDERMAL at 09:09

## 2019-12-22 RX ADMIN — ENOXAPARIN SODIUM 40 MG: 40 INJECTION SUBCUTANEOUS at 09:10

## 2019-12-22 RX ADMIN — METHOCARBAMOL 750 MG: 750 TABLET, FILM COATED ORAL at 05:27

## 2019-12-22 RX ADMIN — ACETAMINOPHEN 975 MG: 325 TABLET ORAL at 11:10

## 2019-12-22 NOTE — PLAN OF CARE
Problem: Potential for Falls  Goal: Patient will remain free of falls  Description  INTERVENTIONS:  - Assess patient frequently for physical needs  -  Identify cognitive and physical deficits and behaviors that affect risk of falls    -  [place] fall precautions as indicated by assessment   - Educate patient/family on patient safety including physical limitations  - Instruct patient to call for assistance with activity based on assessment  - Modify environment to reduce risk of injury  - Consider OT/PT consult to assist with strengthening/mobility  12/22/2019 1156 by Yue Taveras RN  Outcome: Adequate for Discharge  12/22/2019 0810 by uYe Taveras RN  Outcome: Progressing     Problem: PAIN - ADULT  Goal: Verbalizes/displays adequate comfort level or baseline comfort level  Description  Interventions:  - Encourage patient to monitor pain and request assistance  - Assess pain using appropriate pain scale  - Administer analgesics based on type and severity of pain and evaluate response  - Implement non-pharmacological measures as appropriate and evaluate response  - Consider cultural and social influences on pain and pain management  - Notify physician/advanced practitioner if interventions unsuccessful or patient reports new pain  12/22/2019 1156 by Yue Taveras RN  Outcome: Adequate for Discharge  12/22/2019 0811 by Yue Taveras RN  Outcome: Progressing     Problem: DISCHARGE PLANNING  Goal: Discharge to home or other facility with appropriate resources  Description  INTERVENTIONS:  - Identify barriers to discharge w/patient and caregiver  - Arrange for needed discharge resources and transportation as appropriate  - Identify discharge learning needs (meds, wound care, etc )  - Arrange for interpretive services to assist at discharge as needed  - Refer to Case Management Department for coordinating discharge planning if the patient needs post-hospital services based on physician/advanced practitioner order or complex needs related to functional status, cognitive ability, or social support system  12/22/2019 1156 by Laron Guillory RN  Outcome: Adequate for Discharge  12/22/2019 8769 by Laron Guillory, RN  Outcome: Progressing
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
## 2019-12-22 NOTE — PLAN OF CARE
Problem: Potential for Falls  Goal: Patient will remain free of falls  Description  INTERVENTIONS:  - Assess patient frequently for physical needs  -  Identify cognitive and physical deficits and behaviors that affect risk of falls    -  [place] fall precautions as indicated by assessment   - Educate patient/family on patient safety including physical limitations  - Instruct patient to call for assistance with activity based on assessment  - Modify environment to reduce risk of injury  - Consider OT/PT consult to assist with strengthening/mobility  Outcome: Progressing
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
## 2019-12-31 ENCOUNTER — OFFICE VISIT (OUTPATIENT)
Dept: SURGERY | Facility: CLINIC | Age: 34
End: 2019-12-31
Payer: MEDICARE

## 2019-12-31 VITALS — WEIGHT: 156 LBS | BODY MASS INDEX: 28.71 KG/M2 | TEMPERATURE: 98.6 F | HEIGHT: 62 IN

## 2019-12-31 DIAGNOSIS — S22.39XA RIB FRACTURE: Primary | ICD-10-CM

## 2019-12-31 DIAGNOSIS — S22.31XA CLOSED FRACTURE OF ONE RIB OF RIGHT SIDE, INITIAL ENCOUNTER: ICD-10-CM

## 2019-12-31 DIAGNOSIS — S27.329A PULMONARY CONTUSION: ICD-10-CM

## 2019-12-31 DIAGNOSIS — J93.9 PNEUMOTHORAX: ICD-10-CM

## 2019-12-31 PROCEDURE — 99213 OFFICE O/P EST LOW 20 MIN: CPT | Performed by: PHYSICIAN ASSISTANT

## 2019-12-31 RX ORDER — CYCLOBENZAPRINE HCL 5 MG
5 TABLET ORAL 3 TIMES DAILY PRN
Qty: 40 TABLET | Refills: 0 | Status: SHIPPED | OUTPATIENT
Start: 2019-12-31
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
## 2019-12-31 NOTE — LETTER
December 31, 2019     Patient: Jesus Puentes   YOB: 1985   Date of Visit: 12/31/2019       To Whom it May Concern:    Jesus Puentes is under my professional care  She was seen in my office on 12/31/2019  She may return to work on 1/2/20  If you have any questions or concerns, please don't hesitate to call           Sincerely,          Akhil Sullivan PA-C

## 2020-01-02 ENCOUNTER — TELEPHONE (OUTPATIENT)
Dept: SURGERY | Facility: CLINIC | Age: 35
End: 2020-01-02

## 2020-01-02 NOTE — TELEPHONE ENCOUNTER
Per Gayathri Alex patient is cleared to return to work at this time  Please allow patient to return to work at this time

## 2020-02-29 ENCOUNTER — APPOINTMENT (EMERGENCY)
Dept: RADIOLOGY | Facility: HOSPITAL | Age: 35
End: 2020-02-29
Payer: MEDICARE

## 2020-02-29 ENCOUNTER — HOSPITAL ENCOUNTER (EMERGENCY)
Facility: HOSPITAL | Age: 35
Discharge: HOME | End: 2020-02-29
Attending: EMERGENCY MEDICINE
Payer: MEDICARE

## 2020-02-29 VITALS
BODY MASS INDEX: 27.5 KG/M2 | WEIGHT: 150.35 LBS | HEART RATE: 100 BPM | OXYGEN SATURATION: 95 % | SYSTOLIC BLOOD PRESSURE: 116 MMHG | TEMPERATURE: 97.6 F | DIASTOLIC BLOOD PRESSURE: 55 MMHG | RESPIRATION RATE: 18 BRPM

## 2020-02-29 DIAGNOSIS — S30.0XXA: ICD-10-CM

## 2020-02-29 DIAGNOSIS — M53.3 COCCYDYNIA: Primary | ICD-10-CM

## 2020-02-29 LAB
EXT PREG TEST URINE: NORMAL
EXT. CONTROL ED NAV: NORMAL

## 2020-02-29 PROCEDURE — 81025 URINE PREGNANCY TEST: CPT | Performed by: PHYSICIAN ASSISTANT

## 2020-02-29 PROCEDURE — 99284 EMERGENCY DEPT VISIT MOD MDM: CPT

## 2020-02-29 PROCEDURE — 72100 X-RAY EXAM L-S SPINE 2/3 VWS: CPT

## 2020-02-29 PROCEDURE — 99283 EMERGENCY DEPT VISIT LOW MDM: CPT | Performed by: EMERGENCY MEDICINE

## 2020-02-29 RX ORDER — ACETAMINOPHEN 500 MG
500 TABLET ORAL EVERY 6 HOURS PRN
Qty: 30 TABLET | Refills: 0 | Status: SHIPPED | OUTPATIENT
Start: 2020-02-29

## 2020-02-29 RX ORDER — IBUPROFEN 400 MG/1
400 TABLET ORAL EVERY 6 HOURS PRN
Qty: 30 TABLET | Refills: 0 | Status: SHIPPED | OUTPATIENT
Start: 2020-02-29

## 2020-02-29 NOTE — ED PROVIDER NOTES
History  Chief Complaint   Patient presents with    Back Pain     Pt reports pain in her tailbone  Pt denies injury/trauma  Pt reports it is sore when she is sitting  Pt denies radiation  No urinary sympotms or abd pain  History provided by:  Patient   used: No    Back Pain   Quality:  Aching  Pain severity:  Moderate  Onset quality:  Gradual  Duration:  2 days  Timing:  Constant  Progression:  Worsening  Chronicity:  New  Context comment:  Patient denies any specific injury or fall to the ground  Relieved by:  Nothing  Worsened by:  Nothing  Ineffective treatments:  None tried  Associated symptoms: no abdominal pain, no bowel incontinence, no fever, no leg pain, no paresthesias, no pelvic pain, no perianal numbness, no weakness and no weight loss    Risk factors: not obese        Prior to Admission Medications   Prescriptions Last Dose Informant Patient Reported?  Taking?   acetaminophen (TYLENOL) 325 mg tablet   No No   Sig: Take 3 tablets (975 mg total) by mouth every 8 (eight) hours   cyclobenzaprine (FLEXERIL) 5 mg tablet   No No   Sig: Take 1 tablet (5 mg total) by mouth 3 (three) times a day as needed for muscle spasms   loratadine (CLARITIN) 10 mg tablet   No No   Sig: Take 1 tablet (10 mg total) by mouth daily for 10 days   medroxyPROGESTERone (DEPO-PROVERA) 150 mg/mL injection   Yes Yes   Sig: Inject 150 mg into the shoulder, thigh, or buttocks every 3 (three) months   methocarbamol (ROBAXIN) 750 mg tablet   No No   Sig: Take 1 tablet (750 mg total) by mouth every 6 (six) hours for 23 doses      Facility-Administered Medications: None       Past Medical History:   Diagnosis Date    Ep[facility]ps[facility] ([facility] [address] [facility]     No known health problems        Past Surgical History:   Procedure Laterality Date    EYE SURGERY Bilateral        Family History   Problem Relation Age of Onset    No Known Problems Mother     No Known Problems Father      I have reviewed and agree with the history as documented  E-Cigarette/Vaping    E-Cigarette Use Never User      E-Cigarette/Vaping Substances    Nicotine No     THC No     CBD No     Flavoring No     Other No     Unknown No      Social History     Tobacco Use    Smoking status: Current Every Day Smoker     Packs/day: 0 20     Types: Cigarettes    Smokeless tobacco: Never Used   Substance Use Topics    Alcohol use: Yes    Drug use: No       Review of Systems   Constitutional: Negative for fever and weight loss  HENT: Negative for drooling  Respiratory: Negative for cough and stridor  Cardiovascular: Negative for leg swelling  Gastrointestinal: Negative for abdominal pain and bowel incontinence  Genitourinary: Negative for pelvic pain  Musculoskeletal: Positive for back pain (Specifically sacrum coccyx area  )  Allergic/Immunologic: Negative for food allergies  Neurological: Negative for weakness and paresthesias  Hematological: Does not bruise/bleed easily  Physical Exam  Physical Exam   Constitutional: She is oriented to person, place, and time  She appears well-developed and well-nourished  HENT:   Head: Normocephalic and atraumatic  Eyes: Conjunctivae are normal    Neck: Normal range of motion  Neck supple  Cardiovascular: Normal rate  Pulmonary/Chest: Effort normal    Abdominal: Soft  Musculoskeletal: She exhibits tenderness  She exhibits no edema or deformity  Back:    Neurological: She is alert and oriented to person, place, and time  She displays normal reflexes  No cranial nerve deficit or sensory deficit  She exhibits normal muscle tone  Coordination normal    Skin: Skin is warm and dry  Capillary refill takes less than 2 seconds  Nursing note and vitals reviewed        Vital Signs  ED Triage Vitals [02/29/20 0957]   Temperature Pulse Respirations Blood Pressure SpO2   97 6 °F (36 4 °C) 100 18 116/55 95 %      Temp Source Heart Rate Source Patient Position - Orthostatic VS BP Location FiO2 (%) Temporal Monitor Sitting Right arm --      Pain Score       8           Vitals:    02/29/20 0957   BP: 116/55   Pulse: 100   Patient Position - Orthostatic VS: Sitting         Visual Acuity      ED Medications  Medications - No data to display    Diagnostic Studies  Results Reviewed     Procedure Component Value Units Date/Time    POCT pregnancy, urine [[id number]]  (Normal) Resulted:  02/29/20 1021    Lab Status:  Final result Updated:  02/29/20 1027     EXT PREG TEST UR (Ref: Negative) NEAGATIVE     Control valid                 XR lumbar spine 2 or 3 views    (Results Pending)              Procedures  Procedures         ED Course                               MDM  Number of Diagnoses or Management Options  Coccydynia:   Contusion of sacrum:   Diagnosis management comments: 28-year-old female presents emergency department for low back pain sacrum coccyx area pain  Patient denies any specific injury or fall  Patient states worsening pain over last two days  X-ray no specific fracture or malalignment appreciated  Skin no noted changes of the skin or rash  No bruising appreciated  Patient is tender at the sacrum midline and to the coccyx area  Full sense  Patient denies bowel or bladder dysfunction  Patient denies weakness of the lower extremities  At this time will treat conservatively refer patient to comprehensive spine as well as Sports Medicine  Patient encouraged to follow up with these  Patient educated on diagnosis and home management encourage take medicines as prescribed  Patient educated on persistent or worsening signs symptoms and to either follow up with primary care Sports Medicine comprehensive spine and/or return to the emergency department  Patient is understanding agreement was discharged in ambulatory stable condition  Amount and/or Complexity of Data Reviewed  Tests in the radiology section of CPT®: ordered and reviewed (No acute fracture appreciated    No malalignment appreciated )          Disposition  Final diagnoses:   Coccydynia   Contusion of sacrum     Time reflects when diagnosis was documented in both MDM as applicable and the Disposition within this note     Time User Action Codes Description Comment    2/29/2020 11:27 AM Aurelio Maeve Add [M53 3] Coccydynia     2/29/2020 11:27 AM Mike Augustin Ache  0XXA] Contusion of sacrum       ED Disposition     ED Disposition Condition Date/Time Comment    Discharge Stable Sat Feb 29, 2020 11:27 AM Sb Dejesus discharge to home/self care  Follow-up Information     Follow up With Specialties Details Why Contact Info Additional Information    [facility]   [phone], Orthopedics   [address]  [place]  [address] [facility]             Discharge Medication List as of 2/29/2020 11:29 AM      START taking these medications    Details   !! acetaminophen (TYLENOL) 500 mg tablet Take 1 tablet (500 mg total) by mouth every 6 (six) hours as needed for mild pain or moderate pain, Starting Sat 2/29/2020, Normal      ibuprofen (MOTRIN) 400 mg tablet Take 1 tablet (400 mg total) by mouth every 6 (six) hours as needed for mild pain or moderate pain, Starting Sat 2/29/2020, Normal       !! - Potential duplicate medications found  Please discuss with provider        CONTINUE these medications which have NOT CHANGED    Details   medroxyPROGESTERone (DEPO-PROVERA) 150 mg/mL injection Inject 150 mg into the shoulder, thigh, or buttocks every 3 (three) months, Historical Med      !! acetaminophen (TYLENOL) 325 mg tablet Take 3 tablets (975 mg total) by mouth every 8 (eight) hours, Starting Sun 12/22/2019, No Print      cyclobenzaprine (FLEXERIL) 5 mg tablet Take 1 tablet (5 mg total) by mouth 3 (three) times a day as needed for muscle spasms, Starting Tue 12/31/2019, Normal      loratadine (CLARITIN) 10 mg tablet Take 1 tablet (10 mg total) by mouth daily for 10 days, Starting Mon 9/2/2019, Until Thu 9/12/2019, Print      methocarbamol (ROBAXIN) 750 mg tablet Take 1 tablet (750 mg total) by mouth every 6 (six) hours for 23 doses, Starting Sun 12/22/2019, Until Sat 12/28/2019, Normal       !! - Potential duplicate medications found  Please discuss with provider  No discharge procedures on file      PDMP Review     None          ED Provider  Electronically Signed by           Shadi Ludwig PA-C  02/29/20 1547

## 2020-10-29 ENCOUNTER — HOSPITAL ENCOUNTER (EMERGENCY)
Facility: HOSPITAL | Age: 35
Discharge: HOME | End: 2020-10-29
Attending: EMERGENCY MEDICINE | Admitting: EMERGENCY MEDICINE
Payer: COMMERCIAL

## 2020-10-29 VITALS
TEMPERATURE: 97.6 F | RESPIRATION RATE: 16 BRPM | DIASTOLIC BLOOD PRESSURE: 75 MMHG | SYSTOLIC BLOOD PRESSURE: 135 MMHG | BODY MASS INDEX: 28.97 KG/M2 | HEART RATE: 93 BPM | OXYGEN SATURATION: 99 % | WEIGHT: 158.38 LBS

## 2020-10-29 DIAGNOSIS — K08.89 DENTALGIA: Primary | ICD-10-CM

## 2020-10-29 LAB
EXT PREG TEST URINE: NEGATIVE
EXT. CONTROL ED NAV: NORMAL

## 2020-10-29 PROCEDURE — 81025 URINE PREGNANCY TEST: CPT | Performed by: PHYSICIAN ASSISTANT

## 2020-10-29 PROCEDURE — 99283 EMERGENCY DEPT VISIT LOW MDM: CPT

## 2020-10-29 PROCEDURE — 96372 THER/PROPH/DIAG INJ SC/IM: CPT

## 2020-10-29 PROCEDURE — 99284 EMERGENCY DEPT VISIT MOD MDM: CPT | Performed by: PHYSICIAN ASSISTANT

## 2020-10-29 RX ORDER — KETOROLAC TROMETHAMINE 10 MG/1
10 TABLET, FILM COATED ORAL EVERY 6 HOURS PRN
Qty: 20 TABLET | Refills: 0 | Status: SHIPPED | OUTPATIENT
Start: 2020-10-29

## 2020-10-29 RX ORDER — PENICILLIN V POTASSIUM 500 MG/1
500 TABLET ORAL 4 TIMES DAILY
Qty: 28 TABLET | Refills: 0 | Status: SHIPPED | OUTPATIENT
Start: 2020-10-29 | End: 2020-11-05

## 2020-10-29 RX ORDER — KETOROLAC TROMETHAMINE 30 MG/ML
15 INJECTION, SOLUTION INTRAMUSCULAR; INTRAVENOUS ONCE
Status: COMPLETED | OUTPATIENT
Start: 2020-10-29 | End: 2020-10-29

## 2020-10-29 RX ORDER — PENICILLIN V POTASSIUM 250 MG/1
500 TABLET ORAL ONCE
Status: COMPLETED | OUTPATIENT
Start: 2020-10-29 | End: 2020-10-29

## 2020-10-29 RX ADMIN — PENICILLIN V POTASIUM 500 MG: 250 TABLET ORAL at 18:39

## 2020-10-29 RX ADMIN — KETOROLAC TROMETHAMINE 15 MG: 30 INJECTION, SOLUTION INTRAMUSCULAR; INTRAVENOUS at 18:39

## 2021-03-06 ENCOUNTER — HOSPITAL ENCOUNTER (EMERGENCY)
Facility: HOSPITAL | Age: 36
Discharge: HOME | End: 2021-03-06
Attending: EMERGENCY MEDICINE | Admitting: EMERGENCY MEDICINE
Payer: COMMERCIAL

## 2021-03-06 VITALS
SYSTOLIC BLOOD PRESSURE: 142 MMHG | DIASTOLIC BLOOD PRESSURE: 77 MMHG | WEIGHT: 157.41 LBS | BODY MASS INDEX: 28.79 KG/M2 | HEART RATE: 108 BPM | TEMPERATURE: 98.3 F | OXYGEN SATURATION: 100 % | RESPIRATION RATE: 18 BRPM

## 2021-03-06 DIAGNOSIS — S63.92XA SPRAIN OF LEFT HAND, INITIAL ENCOUNTER: ICD-10-CM

## 2021-03-06 DIAGNOSIS — S63.502A SPRAIN OF LEFT WRIST, INITIAL ENCOUNTER: Primary | ICD-10-CM

## 2021-03-06 LAB
EXT PREG TEST URINE: NEGATIVE
EXT. CONTROL ED NAV: NORMAL

## 2021-03-06 PROCEDURE — 99283 EMERGENCY DEPT VISIT LOW MDM: CPT

## 2021-03-06 PROCEDURE — 96372 THER/PROPH/DIAG INJ SC/IM: CPT

## 2021-03-06 PROCEDURE — 81025 URINE PREGNANCY TEST: CPT | Performed by: PHYSICIAN ASSISTANT

## 2021-03-06 PROCEDURE — 99284 EMERGENCY DEPT VISIT MOD MDM: CPT | Performed by: PHYSICIAN ASSISTANT

## 2021-03-06 RX ORDER — NAPROXEN 500 MG/1
500 TABLET ORAL 2 TIMES DAILY WITH MEALS
Qty: 40 TABLET | Refills: 0 | Status: SHIPPED | OUTPATIENT
Start: 2021-03-06 | End: 2022-03-06

## 2021-03-06 RX ORDER — KETOROLAC TROMETHAMINE 30 MG/ML
15 INJECTION, SOLUTION INTRAMUSCULAR; INTRAVENOUS ONCE
Status: COMPLETED | OUTPATIENT
Start: 2021-03-06 | End: 2021-03-06

## 2021-03-06 RX ADMIN — KETOROLAC TROMETHAMINE 15 MG: 30 INJECTION, SOLUTION INTRAMUSCULAR at 09:49

## 2021-03-06 NOTE — ED PROVIDER NOTES
History  Chief Complaint   Patient presents with    Hand Pain     Pt c/o left hand pain x 2 days, denies injury ot hand  reports "but I have CP so I use this hand a lot more than the other one"      Pt presents to the ED with left hand pain and swelling over past several days - taking tylenol w/o relief  No fevers or other symptoms, no injury or trauma  Pt reports she uses her left hand to do everything because she has CP and it has affected her right side and so she cant use her right side to do things  occ gets numbness but primarily pt reports pain  Prior to Admission Medications   Prescriptions Last Dose Informant Patient Reported?  Taking?   acetaminophen (TYLENOL) 325 mg tablet   No No   Sig: Take 3 tablets (975 mg total) by mouth every 8 (eight) hours   acetaminophen (TYLENOL) 500 mg tablet   No No   Sig: Take 1 tablet (500 mg total) by mouth every 6 (six) hours as needed for mild pain or moderate pain   cyclobenzaprine (FLEXERIL) 5 mg tablet   No No   Sig: Take 1 tablet (5 mg total) by mouth 3 (three) times a day as needed for muscle spasms   ibuprofen (MOTRIN) 400 mg tablet   No No   Sig: Take 1 tablet (400 mg total) by mouth every 6 (six) hours as needed for mild pain or moderate pain   ketorolac (TORADOL) 10 mg tablet   No No   Sig: Take 1 tablet (10 mg total) by mouth every 6 (six) hours as needed for moderate pain   loratadine (CLARITIN) 10 mg tablet   No No   Sig: Take 1 tablet (10 mg total) by mouth daily for 10 days   medroxyPROGESTERone (DEPO-PROVERA) 150 mg/mL injection   Yes No   Sig: Inject 150 mg into the shoulder, thigh, or buttocks every 3 (three) months   methocarbamol (ROBAXIN) 750 mg tablet   No No   Sig: Take 1 tablet (750 mg total) by mouth every 6 (six) hours for 23 doses      Facility-Administered Medications: None       Past Medical History:   Diagnosis Date    [facility] [address] [facility]     [facility]     No known health problems        Past Surgical History:   Procedure Laterality Date    EYE SURGERY Bilateral        Family History   Problem Relation Age of Onset    No Known Problems Mother     No Known Problems Father      I have reviewed and agree with the history as documented  E-Cigarette/Vaping    E-Cigarette Use Never User      E-Cigarette/Vaping Substances    Nicotine No     THC No     CBD No     Flavoring No     Other No     Unknown No      Social History     Tobacco Use    Smoking status: Current Every Day Smoker     Packs/day: 0 20     Types: Cigarettes    Smokeless tobacco: Never Used   Substance Use Topics    Alcohol use: Yes    Drug use: No       Review of Systems   All other systems reviewed and are negative  Physical Exam  Physical Exam  Vitals signs and nursing note reviewed  Constitutional:       Appearance: She is well-developed  HENT:      Head: Normocephalic and atraumatic  Right Ear: External ear normal       Left Ear: External ear normal    Eyes:      Conjunctiva/sclera: Conjunctivae normal    Neck:      Musculoskeletal: Neck supple  Cardiovascular:      Rate and Rhythm: Normal rate and regular rhythm  Heart sounds: Normal heart sounds  Pulmonary:      Effort: Pulmonary effort is normal       Breath sounds: Normal breath sounds  Abdominal:      General: Bowel sounds are normal       Palpations: Abdomen is soft  Musculoskeletal:      Left wrist: She exhibits decreased range of motion and tenderness  She exhibits no bony tenderness and no laceration  Left hand: She exhibits decreased range of motion and tenderness  She exhibits no bony tenderness  Comments: Pt has diffuse tenderness, no focal snuff box tenderness or bony tenderness  Good sensation  Skin:     General: Skin is warm  Findings: No rash  Neurological:      Mental Status: She is alert     Psychiatric:         Behavior: Behavior normal          Vital Signs  ED Triage Vitals [03/06/21 0916]   Temperature Pulse Respirations Blood Pressure SpO2   98 3 °F (36 8 °C) (!) 108 18 142/77 100 %      Temp Source Heart Rate Source Patient Position - Orthostatic VS BP Location FiO2 (%)   Oral Monitor -- -- --      Pain Score       8           Vitals:    03/06/21 0916   BP: 142/77   Pulse: (!) 108         Visual Acuity      ED Medications  Medications   ketorolac (TORADOL) injection 15 mg (15 mg Intramuscular Given 3/6/21 0949)       Diagnostic Studies  Results Reviewed     Procedure Component Value Units Date/Time    POCT pregnancy, urine [[id number]]  (Normal) Resulted: 03/06/21 0948    Lab Status: Final result Updated: 03/06/21 0948     EXT PREG TEST UR (Ref: Negative) negative     Control valid                 No orders to display              Procedures  Procedures         ED Course                             SBIRT 22yo+      Most Recent Value   SBIRT (24 yo +)   In order to provide better care to our patients, we are screening all of our patients for alcohol and drug use  Would it be okay to ask you these screening questions? No Filed at: 03/06/2021 0948                    MDM  Number of Diagnoses or Management Options  Sprain of left hand, initial encounter: new and does not require workup  Sprain of left wrist, initial encounter: new and does not require workup  Risk of Complications, Morbidity, and/or Mortality  General comments: Wrist brace applied left wrist and hand - nv intact - Instructions reviewed w pt       Patient Progress  Patient progress: improved      Disposition  Final diagnoses:   Sprain of left wrist, initial encounter   Sprain of left hand, initial encounter     Time reflects when diagnosis was documented in both MDM as applicable and the Disposition within this note     Time User Action Codes Description Comment    3/6/2021  9:50 AM Nidhi Rosado [F77 037D,  V98 452A] Sprain of multiple digits of left hand including sprain of thumb, initial encounter     3/6/2021  9:50 AM Nidhi Rosado [S63 385A] Sprain of left wrist, initial encounter     3/6/2021  9:51 AM Nidhi Rosado Modify [H82 842E] Sprain of left wrist, initial encounter     3/6/2021  9:51 AM Nidhi Rosado Remove [J97 995T,  S63 392A] Sprain of multiple digits of left hand including sprain of thumb, initial encounter     3/6/2021  9:51 AM Nidhi Rosado Add [V89 79UA] Sprain of left hand, initial encounter       ED Disposition     ED Disposition Condition Date/Time Comment    Discharge Stable Sat Mar 6, 2021  9:50 AM Corrina Vargas discharge to home/self care              Follow-up Information     Follow up With Specialties Details Why [address] [facility]   [address]  [phone]            Discharge Medication List as of 3/6/2021  9:52 AM      START taking these medications    Details   naproxen (EC NAPROSYN) 500 MG EC tablet Take 1 tablet (500 mg total) by mouth 2 (two) times a day with meals, Starting Sat 3/6/2021, Until Sun 3/6/2022, Normal         CONTINUE these medications which have NOT CHANGED    Details   !! acetaminophen (TYLENOL) 325 mg tablet Take 3 tablets (975 mg total) by mouth every 8 (eight) hours, Starting Sun 12/22/2019, No Print      !! acetaminophen (TYLENOL) 500 mg tablet Take 1 tablet (500 mg total) by mouth every 6 (six) hours as needed for mild pain or moderate pain, Starting Sat 2/29/2020, Normal      cyclobenzaprine (FLEXERIL) 5 mg tablet Take 1 tablet (5 mg total) by mouth 3 (three) times a day as needed for muscle spasms, Starting Tue 12/31/2019, Normal      ibuprofen (MOTRIN) 400 mg tablet Take 1 tablet (400 mg total) by mouth every 6 (six) hours as needed for mild pain or moderate pain, Starting Sat 2/29/2020, Normal      ketorolac (TORADOL) 10 mg tablet Take 1 tablet (10 mg total) by mouth every 6 (six) hours as needed for moderate pain, Starting Thu 10/29/2020, Normal      loratadine (CLARITIN) 10 mg tablet Take 1 tablet (10 mg total) by mouth daily for 10 days, Starting Mon 9/2/2019, Until Thu 9/12/2019, Print      medroxyPROGESTERone (DEPO-PROVERA) 150 mg/mL injection Inject 150 mg into the shoulder, thigh, or buttocks every 3 (three) months, Historical Med      methocarbamol (ROBAXIN) 750 mg tablet Take 1 tablet (750 mg total) by mouth every 6 (six) hours for 23 doses, Starting Sun 12/22/2019, Until Sat 12/28/2019, Normal       !! - Potential duplicate medications found  Please discuss with provider              PDMP Review     None          ED Provider  Electronically Signed by           Shahrzad López PA-C  03/06/21 8307

## 2021-03-06 NOTE — Clinical Note
Saranya Severino was seen and treated in our emergency department on 3/6/2021  Diagnosis:     Sharonda Lamasjasiel    She may return on this date: 03/09/2021         If you have any questions or concerns, please don't hesitate to call        Nidhi Rosado PA-C    ______________________________           _______________          _______________  Hospital Representative                              Date                                Time

## 2021-10-12 ENCOUNTER — HOSPITAL ENCOUNTER (EMERGENCY)
Facility: HOSPITAL | Age: 36
Discharge: HOME | End: 2021-10-12
Attending: EMERGENCY MEDICINE
Payer: COMMERCIAL

## 2021-10-12 VITALS
BODY MASS INDEX: 28.59 KG/M2 | HEART RATE: 105 BPM | TEMPERATURE: 97.7 F | OXYGEN SATURATION: 100 % | WEIGHT: 156.31 LBS | RESPIRATION RATE: 16 BRPM | DIASTOLIC BLOOD PRESSURE: 92 MMHG | SYSTOLIC BLOOD PRESSURE: 127 MMHG

## 2021-10-12 DIAGNOSIS — M62.838 MUSCLE SPASM: Primary | ICD-10-CM

## 2021-10-12 PROCEDURE — 96372 THER/PROPH/DIAG INJ SC/IM: CPT

## 2021-10-12 PROCEDURE — 99283 EMERGENCY DEPT VISIT LOW MDM: CPT

## 2021-10-12 PROCEDURE — 99284 EMERGENCY DEPT VISIT MOD MDM: CPT | Performed by: PHYSICIAN ASSISTANT

## 2021-10-12 RX ORDER — LIDOCAINE 50 MG/G
1 PATCH TOPICAL DAILY
Qty: 15 PATCH | Refills: 0 | Status: SHIPPED | OUTPATIENT
Start: 2021-10-12

## 2021-10-12 RX ORDER — DIAZEPAM 5 MG/1
5 TABLET ORAL ONCE
Status: COMPLETED | OUTPATIENT
Start: 2021-10-12 | End: 2021-10-12

## 2021-10-12 RX ORDER — NAPROXEN 500 MG/1
500 TABLET ORAL 2 TIMES DAILY WITH MEALS
Qty: 30 TABLET | Refills: 0 | Status: SHIPPED | OUTPATIENT
Start: 2021-10-12

## 2021-10-12 RX ORDER — LIDOCAINE 50 MG/G
1 PATCH TOPICAL ONCE
Status: DISCONTINUED | OUTPATIENT
Start: 2021-10-12 | End: 2021-10-13 | Stop reason: HOSPADM

## 2021-10-12 RX ORDER — DIAZEPAM 5 MG/1
5 TABLET ORAL 2 TIMES DAILY
Qty: 20 TABLET | Refills: 0 | Status: SHIPPED | OUTPATIENT
Start: 2021-10-12 | End: 2021-10-22

## 2021-10-12 RX ORDER — KETOROLAC TROMETHAMINE 30 MG/ML
30 INJECTION, SOLUTION INTRAMUSCULAR; INTRAVENOUS ONCE
Status: COMPLETED | OUTPATIENT
Start: 2021-10-12 | End: 2021-10-12

## 2021-10-12 RX ADMIN — DIAZEPAM 5 MG: 5 TABLET ORAL at 21:13

## 2021-10-12 RX ADMIN — KETOROLAC TROMETHAMINE 30 MG: 30 INJECTION, SOLUTION INTRAMUSCULAR; INTRAVENOUS at 21:13

## 2021-10-12 RX ADMIN — LIDOCAINE 1 PATCH: 50 PATCH TOPICAL at 21:15

## 2021-10-26 ENCOUNTER — NURSE TRIAGE (OUTPATIENT)
Dept: PHYSICAL THERAPY | Facility: OTHER | Age: 36
End: 2021-10-26

## 2021-10-26 DIAGNOSIS — M54.9 OTHER ACUTE BACK PAIN: Primary | ICD-10-CM

## 2024-06-04 ENCOUNTER — HOSPITAL ENCOUNTER (EMERGENCY)
Facility: HOSPITAL | Age: 39
Discharge: HOME | End: 2024-06-05
Attending: EMERGENCY MEDICINE
Payer: COMMERCIAL

## 2024-06-04 ENCOUNTER — APPOINTMENT (EMERGENCY)
Dept: RADIOLOGY | Facility: HOSPITAL | Age: 39
End: 2024-06-04
Payer: COMMERCIAL

## 2024-06-04 DIAGNOSIS — S80.01XA CONTUSION OF RIGHT KNEE, INITIAL ENCOUNTER: ICD-10-CM

## 2024-06-04 DIAGNOSIS — S92.414A CLOSED NONDISPLACED FRACTURE OF PROXIMAL PHALANX OF RIGHT GREAT TOE, INITIAL ENCOUNTER: Primary | ICD-10-CM
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
## 2024-06-04 PROCEDURE — 73564 X-RAY EXAM KNEE 4 OR MORE: CPT

## 2024-06-04 PROCEDURE — 99284 EMERGENCY DEPT VISIT MOD MDM: CPT | Performed by: EMERGENCY MEDICINE

## 2024-06-04 PROCEDURE — 73660 X-RAY EXAM OF TOE(S): CPT

## 2024-06-04 PROCEDURE — 99284 EMERGENCY DEPT VISIT MOD MDM: CPT

## 2024-06-04 RX ORDER — IBUPROFEN 600 MG/1
600 TABLET ORAL ONCE
Status: COMPLETED | OUTPATIENT
Start: 2024-06-05 | End: 2024-06-04

## 2024-06-04 RX ORDER — ACETAMINOPHEN 325 MG/1
650 TABLET ORAL ONCE
Status: COMPLETED | OUTPATIENT
Start: 2024-06-05 | End: 2024-06-04

## 2024-06-04 RX ORDER — LIDOCAINE HYDROCHLORIDE 10 MG/ML
10 INJECTION, SOLUTION EPIDURAL; INFILTRATION; INTRACAUDAL; PERINEURAL ONCE
Status: COMPLETED | OUTPATIENT
Start: 2024-06-04 | End: 2024-06-05

## 2024-06-04 RX ADMIN — ACETAMINOPHEN 650 MG: 325 TABLET, FILM COATED ORAL at 23:59

## 2024-06-04 RX ADMIN — IBUPROFEN 600 MG: 600 TABLET, FILM COATED ORAL at 23:59

## 2024-06-04 NOTE — Clinical Note
Valeria Mayfield was seen and treated in our emergency department on 6/4/2024.                Diagnosis: Toe fracture    Valeria  may return to work on return date.    She may return on this date: 06/06/2024         If you have any questions or concerns, please don't hesitate to call.      Carlos Eduardo Grace, DO    ______________________________           _______________          _______________  Hospital Representative                              Date                                Time

## 2024-06-05 VITALS
OXYGEN SATURATION: 99 % | HEART RATE: 89 BPM | TEMPERATURE: 98.3 F | RESPIRATION RATE: 18 BRPM | SYSTOLIC BLOOD PRESSURE: 119 MMHG | DIASTOLIC BLOOD PRESSURE: 88 MMHG

## 2024-06-05 RX ADMIN — LIDOCAINE HYDROCHLORIDE 10 ML: 10 INJECTION, SOLUTION EPIDURAL; INFILTRATION; INTRACAUDAL at 00:00

## 2024-06-05 NOTE — ED PROVIDER NOTES
History  Chief Complaint   Patient presents with    Fall     Pt was cleaning when she fell down concrete steps. Pt c/o right great toe pain, multiple scrapes noted to right leg. +head/face strike. Denies thinners. Denies LOC +etoh     38-year-old female who presents for evaluation of right great toe and right knee pain after falling down 2 steps.  The patient states that she was cleaning her front porch when she tripped and fell down the steps.  The patient has significant pain in her right great toe and states that it is bent sideways.  The patient was unable to bear weight due to the pain.  The patient additionally reports scraping her right knee.  The patient hit her head on the ground but did not lose consciousness.  The patient reports having had 2 alcoholic beverages earlier this evening.  The patient denies headache, visual disturbances, neck pain, back pain, chest pain, shortness of breath, nausea, vomiting, abdominal pain, numbness or weakness in her extremities.        Prior to Admission Medications   Prescriptions Last Dose Informant Patient Reported? Taking?   acetaminophen (TYLENOL) 325 mg tablet   No No   Sig: Take 3 tablets (975 mg total) by mouth every 8 (eight) hours   acetaminophen (TYLENOL) 500 mg tablet   No No   Sig: Take 1 tablet (500 mg total) by mouth every 6 (six) hours as needed for mild pain or moderate pain   cyclobenzaprine (FLEXERIL) 5 mg tablet   No No   Sig: Take 1 tablet (5 mg total) by mouth 3 (three) times a day as needed for muscle spasms   diazepam (VALIUM) 5 mg tablet   No No   Sig: Take 1 tablet (5 mg total) by mouth 2 (two) times a day for 10 days   ibuprofen (MOTRIN) 400 mg tablet   No No   Sig: Take 1 tablet (400 mg total) by mouth every 6 (six) hours as needed for mild pain or moderate pain   ketorolac (TORADOL) 10 mg tablet   No No   Sig: Take 1 tablet (10 mg total) by mouth every 6 (six) hours as needed for moderate pain   lidocaine (Lidoderm) 5 %   No No   Sig: Apply 1  patch topically daily Remove & Discard patch within 12 hours or as directed by MD   loratadine (CLARITIN) 10 mg tablet   No No   Sig: Take 1 tablet (10 mg total) by mouth daily for 10 days   medroxyPROGESTERone (DEPO-PROVERA) 150 mg/mL injection   Yes No   Sig: Inject 150 mg into the shoulder, thigh, or buttocks every 3 (three) months   methocarbamol (ROBAXIN) 750 mg tablet   No No   Sig: Take 1 tablet (750 mg total) by mouth every 6 (six) hours for 23 doses   naproxen (Naprosyn) 500 mg tablet   No No   Sig: Take 1 tablet (500 mg total) by mouth 2 (two) times a day with meals      Facility-Administered Medications: None       Past Medical History:   Diagnosis Date    Cerebral palsy (HCC)     Epilepsia     No known health problems        Past Surgical History:   Procedure Laterality Date    EYE SURGERY Bilateral        Family History   Problem Relation Age of Onset    No Known Problems Mother     No Known Problems Father      I have reviewed and agree with the history as documented.    E-Cigarette/Vaping    E-Cigarette Use Never User      E-Cigarette/Vaping Substances    Nicotine No     THC No     CBD No     Flavoring No     Other No     Unknown No      Social History     Tobacco Use    Smoking status: Every Day     Current packs/day: 0.20     Types: Cigarettes    Smokeless tobacco: Never   Vaping Use    Vaping status: Never Used   Substance Use Topics    Alcohol use: Yes    Drug use: No        Review of Systems   Musculoskeletal:  Positive for arthralgias. Negative for back pain and neck pain.   Skin:  Positive for wound.       Physical Exam  ED Triage Vitals   Temperature Pulse Respirations Blood Pressure SpO2   06/04/24 2330 06/04/24 2330 06/04/24 2330 06/04/24 2330 06/04/24 2330   98.3 °F (36.8 °C) (!) 115 18 119/88 99 %      Temp Source Heart Rate Source Patient Position - Orthostatic VS BP Location FiO2 (%)   06/04/24 2330 06/04/24 2330 06/04/24 2330 06/04/24 2330 --   Oral Monitor Sitting Right arm       Pain  Score       06/04/24 2359       10 - Worst Possible Pain             Orthostatic Vital Signs  Vitals:    06/04/24 2330   BP: 119/88   Pulse: (!) 115   Patient Position - Orthostatic VS: Sitting       Physical Exam  Vitals and nursing note reviewed.   Constitutional:       General: She is not in acute distress.     Appearance: Normal appearance. She is not ill-appearing, toxic-appearing or diaphoretic.   HENT:      Head: Normocephalic. Abrasion present.      Comments: Impression of the bridge of the nose.  No tenderness of facial bones.     Right Ear: External ear normal.      Left Ear: External ear normal.      Nose: Nose normal. No congestion or rhinorrhea.      Mouth/Throat:      Mouth: Mucous membranes are moist.      Pharynx: Oropharynx is clear.   Eyes:      General: No scleral icterus.     Extraocular Movements: Extraocular movements intact.      Conjunctiva/sclera: Conjunctivae normal.      Pupils: Pupils are equal, round, and reactive to light.   Cardiovascular:      Rate and Rhythm: Normal rate and regular rhythm.      Pulses: Normal pulses.      Heart sounds: Normal heart sounds. No murmur heard.     No friction rub. No gallop.   Pulmonary:      Effort: Pulmonary effort is normal.      Breath sounds: Normal breath sounds. No wheezing, rhonchi or rales.   Abdominal:      General: Abdomen is flat.      Palpations: Abdomen is soft.      Tenderness: There is no abdominal tenderness. There is no right CVA tenderness, left CVA tenderness, guarding or rebound.   Musculoskeletal:         General: Tenderness, deformity and signs of injury present. No swelling. Normal range of motion.      Cervical back: Normal range of motion and neck supple. No rigidity or tenderness.      Right lower leg: No edema.      Left lower leg: No edema.      Comments: RLE: Full range of motion of the hip, knee, and ankle.  Generalized tenderness of the right knee.  Tenderness of the first toe with angulated deformity.  No tenderness of  the midfoot or calcaneus.  2+ PT and DP pulses.  Capillary refill less than 2 seconds.    No other bony tenderness.   Lymphadenopathy:      Cervical: No cervical adenopathy.   Skin:     General: Skin is warm and dry.      Capillary Refill: Capillary refill takes less than 2 seconds.      Coloration: Skin is not jaundiced or pale.      Findings: Lesion present. No bruising, erythema or rash.      Comments: Superficial abrasion to the bridge of the nose.  Scattered abrasions on the right knee.   Neurological:      General: No focal deficit present.      Mental Status: She is alert and oriented to person, place, and time.      Cranial Nerves: No cranial nerve deficit.      Sensory: No sensory deficit.      Motor: No weakness.         ED Medications  Medications   lidocaine (PF) (XYLOCAINE-MPF) 1 % injection 10 mL (10 mL Infiltration Given 6/5/24 0000)   acetaminophen (TYLENOL) tablet 650 mg (650 mg Oral Given 6/4/24 2359)   ibuprofen (MOTRIN) tablet 600 mg (600 mg Oral Given 6/4/24 2359)       Diagnostic Studies  Results Reviewed       None                   XR toe great min 2 view RIGHT    (Results Pending)   XR knee 4+ views Right injury    (Results Pending)         Procedures  Procedures      ED Course                                       Medical Decision Making  38-year-old female who presents for evaluation of right great toe and right knee pain after falling down 2 steps.  The patient is tachycardic with a heart rate of 115.  The remainder the patient's vitals are within the normal limits.  Physical exam is significant for abrasions to the bridge of the nose, abrasions to the right knee, mild tenderness of the right knee, angulated deformity of the right first toe.  The patient reports drinking alcohol tonight but appears clinically sober.  Do not feel that the patient requires imaging of the head.  Will order x-ray of the right knee and right great toe/foot.  Will treat the patient's pain with digital block,  Tylenol, and ibuprofen.    The patient reports significant relief of her pain after digital block.  X-ray shows displaced, comminuted fracture of the proximal phalanx of the first right toe on my interpretation.  X-ray of the right knee shows no fractures on my interpretation.  X-ray of the right great toe is sent to podiatry who recommends surgical shoe with heel weightbearing and outpatient follow-up in the podiatry office.  The patient is placed in a surgical shoe.  The patient is instructed to call podiatry tomorrow.  Return precautions are given and the patient is discharged.    Amount and/or Complexity of Data Reviewed  Radiology: ordered.    Risk  OTC drugs.  Prescription drug management.          Disposition  Final diagnoses:   Closed nondisplaced fracture of proximal phalanx of right great toe, initial encounter   Fall, initial encounter   Contusion of right knee, initial encounter     Time reflects when diagnosis was documented in both MDM as applicable and the Disposition within this note       Time User Action Codes Description Comment    6/5/2024 12:39 AM Carlos Eduardo Grace Add [S92.414A] Closed nondisplaced fracture of proximal phalanx of right great toe, initial encounter     6/5/2024 12:42 AM Carlos Eduardo Grace Add [W19.XXXA] Fall, initial encounter     6/5/2024 12:42 AM Carlos Eduardo Grace Add [S80.01XA] Contusion of right knee, initial encounter           ED Disposition       ED Disposition   Discharge    Condition   Stable    Date/Time   Wed Jun 5, 2024 12:42 AM    Comment   Valeria Mayfield discharge to home/self care.                   Follow-up Information       Follow up With Specialties Details Why Contact Info Additional Information    [facility] Emergency Department Emergency Medicine Go to  If symptoms worsen [address] [facility] [address]  [phone] [facility] Emergency Department, [address] [place], [place], [address]    [facility]  [facility]iat[facility] [place] [facility] Call today  [address] [facility] [address] [phone] [facility] [place] [address] [place] [address] [phone]            Patient's Medications   Discharge Prescriptions    No medications on file     No discharge procedures on file.    PDMP Review       None             ED Provider  Attending physically available and evaluated Valeria Mayfield. I managed the patient along with the ED Attending.    Electronically Signed by           Carlos Eduardo Grace DO  06/05/24 0106

## 2024-06-05 NOTE — DISCHARGE INSTRUCTIONS
You were seen in the emergency department after a fall.  Your x-ray showed a fracture of your right big toe.  Wear the shoe that was provided to you at all times and bear weight on your heel.  Take medications such as Tylenol and ibuprofen for your pain.  Call the attached number to schedule an appointment with podiatry.  If you have worsening pain, develop numbness in your toe, or develop any new concerning symptoms please return to the emergency department.

## 2024-06-07 ENCOUNTER — TELEPHONE (OUTPATIENT)
Age: 39
End: 2024-06-07

## 2024-06-11 ENCOUNTER — OFFICE VISIT (OUTPATIENT)
Dept: PODIATRY | Facility: CLINIC | Age: 39
End: 2024-06-11
Payer: COMMERCIAL

## 2024-06-11 VITALS — DIASTOLIC BLOOD PRESSURE: 78 MMHG | SYSTOLIC BLOOD PRESSURE: 120 MMHG | HEART RATE: 84 BPM

## 2024-06-11 DIAGNOSIS — S92.411A CLOSED DISPLACED FRACTURE OF PROXIMAL PHALANX OF RIGHT GREAT TOE, INITIAL ENCOUNTER: Primary | ICD-10-CM

## 2024-06-11 PROCEDURE — 99204 OFFICE O/P NEW MOD 45 MIN: CPT | Performed by: PODIATRIST

## 2024-06-11 RX ORDER — OXYCODONE HYDROCHLORIDE 5 MG/1
TABLET ORAL
COMMUNITY
Start: 2024-06-08 | End: 2024-06-18

## 2024-06-11 RX ORDER — FOLIC ACID 1 MG/1
1 TABLET ORAL DAILY
COMMUNITY
Start: 2023-11-02 | End: 2024-11-01

## 2024-06-11 NOTE — PROGRESS NOTES
Ambulatory Visit  Name: Valeria Mayfield      : 1985      MRN: [id number]  Encounter Provider: Mahesh Tracey DPM  Encounter Date: 2024   Encounter department: [facility] [place]    Assessment & Plan   1. Closed displaced fracture of proximal phalanx of right great toe, initial encounter  -     Case request operating room: OPEN REDUCTION W INTERNAL FIXATION (ORIF) TOE; Standing  -     Case request operating room: OPEN REDUCTION W INTERNAL FIXATION (ORIF) TOE  -     Cam Boot  Diagnosis and options discussed with patient  Patient agreeable to the plan as stated below    Patient is comminuted malaligned fracture proximal phalanx great toe right foot. This should get reduced and fixated or it will be malaligned once it heals    Consent was signed for ORIF right great toe fracture    Surgical procedure and recovery discussed as can best be predicted.  Alternatives, risks, complications covered with the patient. XR reviewed with patient, see my report below    Appropriate postop medication discussed, educated patient on narcotic medication and its risks.     Patient will be sent for preoperative testing and clearance    Patient doen not need DVT prophylaxis for this procedure      History of Present Illness     Valeria Mayfield is a 38 y.o. female who presents who fell 24. Her right great toe got injured. She went to the ED and had broken her toe. IT is crooked and very painful.     Review of Systems  As stated in HPI, otherwise normal    Medical History Reviewed by provider this encounter:  Tobacco  Allergies  Meds  Problems  Med Hx  Surg Hx  Fam Hx        Objective     /78 (BP Location: Right arm, Patient Position: Sitting, Cuff Size: Standard)   Pulse 84   LMP 2024     Physical Exam  Vitals reviewed.   Cardiovascular:      Rate and Rhythm: Normal rate.      Pulses: Normal pulses.   Pulmonary:      Effort: No respiratory distress.   Musculoskeletal:         General:  Deformity (right great toe is abducted at proximal phalanx fracture, severe pain and edema.) present.   Skin:     Capillary Refill: Capillary refill takes less than 2 seconds.      Findings: No erythema.   Neurological:      Mental Status: She is alert and oriented to person, place, and time.      Sensory: No sensory deficit.       Administrative Statements   I have spent a total time of 45 minutes on 06/12/24 In caring for this patient including Diagnostic results, Prognosis, Risks and benefits of tx options, Patient and family education, Importance of tx compliance, Impressions, Counseling / Coordination of care, Documenting in the medical record, Reviewing / ordering tests, medicine, procedures  , Obtaining or reviewing history  , and consent for surgery .    XRay 3 views of the right foot personally read by Dr. Tracey in office today and discussed with patient:    Comminuted angulated fracture proximal phalanx. The head is angulated dorsally and laterally angulated. Appear to be compacted and comminuted  No significant degenerative changes.  No lytic or blastic osseous lesion.  Soft tissues are unremarkable.

## 2024-06-14 ENCOUNTER — TELEPHONE (OUTPATIENT)
Age: 39
End: 2024-06-14

## 2024-06-14 NOTE — TELEPHONE ENCOUNTER
Caller: Valeria Mayfield    Doctor and/or Office: Dr. Tracey/Stacey    #: [phone]    Escalation: Surgery Patient is requesting another Xray before her surgery because the swelling went down in her foot and she can walk normal on it. Please call and advise. Thank you   ep'ed chantix to pharm requested

## 2024-06-17 DIAGNOSIS — S92.411A CLOSED DISPLACED FRACTURE OF PROXIMAL PHALANX OF RIGHT GREAT TOE, INITIAL ENCOUNTER: Primary | ICD-10-CM

## 2024-06-18 NOTE — TELEPHONE ENCOUNTER
Called and informed patient her order s in and she can go to any [facility] urgent care for the xray